# Patient Record
Sex: FEMALE | Race: WHITE | NOT HISPANIC OR LATINO
[De-identification: names, ages, dates, MRNs, and addresses within clinical notes are randomized per-mention and may not be internally consistent; named-entity substitution may affect disease eponyms.]

---

## 2019-04-01 ENCOUNTER — TRANSCRIPTION ENCOUNTER (OUTPATIENT)
Age: 29
End: 2019-04-01

## 2021-09-27 ENCOUNTER — EMERGENCY (EMERGENCY)
Facility: HOSPITAL | Age: 31
LOS: 1 days | Discharge: ANOTHER HOSPITAL | End: 2021-09-27
Attending: EMERGENCY MEDICINE
Payer: COMMERCIAL

## 2021-09-27 VITALS
OXYGEN SATURATION: 100 % | TEMPERATURE: 98 F | WEIGHT: 147.93 LBS | RESPIRATION RATE: 18 BRPM | DIASTOLIC BLOOD PRESSURE: 77 MMHG | SYSTOLIC BLOOD PRESSURE: 132 MMHG | HEART RATE: 79 BPM

## 2021-09-27 DIAGNOSIS — F41.1 GENERALIZED ANXIETY DISORDER: ICD-10-CM

## 2021-09-27 DIAGNOSIS — F33.2 MAJOR DEPRESSIVE DISORDER, RECURRENT SEVERE WITHOUT PSYCHOTIC FEATURES: ICD-10-CM

## 2021-09-27 LAB
ANION GAP SERPL CALC-SCNC: 6 MMOL/L — SIGNIFICANT CHANGE UP (ref 5–17)
APAP SERPL-MCNC: <10 UG/ML — SIGNIFICANT CHANGE UP (ref 10–30)
APPEARANCE UR: CLEAR — SIGNIFICANT CHANGE UP
BASOPHILS # BLD AUTO: 0.06 K/UL — SIGNIFICANT CHANGE UP (ref 0–0.2)
BASOPHILS NFR BLD AUTO: 0.7 % — SIGNIFICANT CHANGE UP (ref 0–2)
BILIRUB UR-MCNC: NEGATIVE — SIGNIFICANT CHANGE UP
BUN SERPL-MCNC: 11 MG/DL — SIGNIFICANT CHANGE UP (ref 7–18)
CALCIUM SERPL-MCNC: 9.6 MG/DL — SIGNIFICANT CHANGE UP (ref 8.4–10.5)
CHLORIDE SERPL-SCNC: 105 MMOL/L — SIGNIFICANT CHANGE UP (ref 96–108)
CO2 SERPL-SCNC: 28 MMOL/L — SIGNIFICANT CHANGE UP (ref 22–31)
COLOR SPEC: YELLOW — SIGNIFICANT CHANGE UP
COVID-19 SPIKE DOMAIN AB INTERP: POSITIVE
COVID-19 SPIKE DOMAIN ANTIBODY RESULT: >250 U/ML — HIGH
CREAT SERPL-MCNC: 0.75 MG/DL — SIGNIFICANT CHANGE UP (ref 0.5–1.3)
DIFF PNL FLD: NEGATIVE — SIGNIFICANT CHANGE UP
EOSINOPHIL # BLD AUTO: 0.16 K/UL — SIGNIFICANT CHANGE UP (ref 0–0.5)
EOSINOPHIL NFR BLD AUTO: 1.8 % — SIGNIFICANT CHANGE UP (ref 0–6)
ETHANOL SERPL-MCNC: <3 MG/DL — SIGNIFICANT CHANGE UP (ref 0–10)
GLUCOSE SERPL-MCNC: 114 MG/DL — HIGH (ref 70–99)
GLUCOSE UR QL: NEGATIVE — SIGNIFICANT CHANGE UP
HCG SERPL-ACNC: <1 MIU/ML — HIGH
HCT VFR BLD CALC: 42.3 % — SIGNIFICANT CHANGE UP (ref 39–50)
HGB BLD-MCNC: 14 G/DL — SIGNIFICANT CHANGE UP (ref 13–17)
IMM GRANULOCYTES NFR BLD AUTO: 0.2 % — SIGNIFICANT CHANGE UP (ref 0–1.5)
KETONES UR-MCNC: NEGATIVE — SIGNIFICANT CHANGE UP
LEUKOCYTE ESTERASE UR-ACNC: NEGATIVE — SIGNIFICANT CHANGE UP
LYMPHOCYTES # BLD AUTO: 2.57 K/UL — SIGNIFICANT CHANGE UP (ref 1–3.3)
LYMPHOCYTES # BLD AUTO: 29.1 % — SIGNIFICANT CHANGE UP (ref 13–44)
MCHC RBC-ENTMCNC: 28.6 PG — SIGNIFICANT CHANGE UP (ref 27–34)
MCHC RBC-ENTMCNC: 33.1 GM/DL — SIGNIFICANT CHANGE UP (ref 32–36)
MCV RBC AUTO: 86.5 FL — SIGNIFICANT CHANGE UP (ref 80–100)
MONOCYTES # BLD AUTO: 0.47 K/UL — SIGNIFICANT CHANGE UP (ref 0–0.9)
MONOCYTES NFR BLD AUTO: 5.3 % — SIGNIFICANT CHANGE UP (ref 2–14)
NEUTROPHILS # BLD AUTO: 5.56 K/UL — SIGNIFICANT CHANGE UP (ref 1.8–7.4)
NEUTROPHILS NFR BLD AUTO: 62.9 % — SIGNIFICANT CHANGE UP (ref 43–77)
NITRITE UR-MCNC: NEGATIVE — SIGNIFICANT CHANGE UP
NRBC # BLD: 0 /100 WBCS — SIGNIFICANT CHANGE UP (ref 0–0)
PCP SPEC-MCNC: SIGNIFICANT CHANGE UP
PH UR: 6 — SIGNIFICANT CHANGE UP (ref 5–8)
PLATELET # BLD AUTO: 405 K/UL — HIGH (ref 150–400)
POTASSIUM SERPL-MCNC: 3.9 MMOL/L — SIGNIFICANT CHANGE UP (ref 3.5–5.3)
POTASSIUM SERPL-SCNC: 3.9 MMOL/L — SIGNIFICANT CHANGE UP (ref 3.5–5.3)
PROT UR-MCNC: NEGATIVE — SIGNIFICANT CHANGE UP
RBC # BLD: 4.89 M/UL — SIGNIFICANT CHANGE UP (ref 4.2–5.8)
RBC # FLD: 12.3 % — SIGNIFICANT CHANGE UP (ref 10.3–14.5)
SALICYLATES SERPL-MCNC: <1.7 MG/DL — LOW (ref 2.8–20)
SARS-COV-2 IGG+IGM SERPL QL IA: >250 U/ML — HIGH
SARS-COV-2 IGG+IGM SERPL QL IA: POSITIVE
SARS-COV-2 RNA SPEC QL NAA+PROBE: SIGNIFICANT CHANGE UP
SODIUM SERPL-SCNC: 139 MMOL/L — SIGNIFICANT CHANGE UP (ref 135–145)
SP GR SPEC: 1.01 — SIGNIFICANT CHANGE UP (ref 1.01–1.02)
UROBILINOGEN FLD QL: NEGATIVE — SIGNIFICANT CHANGE UP
WBC # BLD: 8.84 K/UL — SIGNIFICANT CHANGE UP (ref 3.8–10.5)
WBC # FLD AUTO: 8.84 K/UL — SIGNIFICANT CHANGE UP (ref 3.8–10.5)

## 2021-09-27 PROCEDURE — 99285 EMERGENCY DEPT VISIT HI MDM: CPT

## 2021-09-27 PROCEDURE — 90792 PSYCH DIAG EVAL W/MED SRVCS: CPT | Mod: 95

## 2021-09-27 NOTE — ED BEHAVIORAL HEALTH ASSESSMENT NOTE - RISK ASSESSMENT
High Acute Suicide Risk Has h/o cutting, psych admission, active depression/SI not on meds, no access to firearm, has supportive social network, at moderate to high acute risk at this time, and has chronic risk as well

## 2021-09-27 NOTE — ED BEHAVIORAL HEALTH NOTE - BEHAVIORAL HEALTH NOTE
============  ED COURSE   ============  SOURCE: Chart review     ARRIVAL:  Walk in   BELONGINGS: Nothing of note    BEHAVIOR: Pt was compliant with good cooperation for entire process of wanding/search/ and gowning and was escorted to the  area with no issues. Nothing of note in pt’s belongings. RN reports patient provided both urine specimen and routine blood work willingly. Hygiene is good, pt. endorses SI with a plan to jump out of a window, denies HI. Pt’s affect is depressed, speech is at a normal rate, clear, good articulation, thoughts are linear and logical, eye contact is good, denies AVH/delusions, no aggression or behavioral issues and is AOX4.   TREATMENT:  None required  VISITORS:  Spouse was at bedside    ========================  COLLATERAL  ========================  NAME: Agustín Castañeda  NUMBER: [phone]  RELATIONSHIP: Spouse  COMMENTS: Left spouse 2 voicemail's waiting for a callback      “Patient gives permission to obtain collateral from Spouse_____:  (X  ) Yes  (  )  No  Rationale for overriding objection            (  ) Lack of capacity. Details: ________            (  ) Assessing risk of danger to self/others. Details: ________      Rationale for selecting specific collateral source            (  ) Potential to impact risk of danger to self/others and no alternative equivalent. Details:     COVID Exposure Screen- collateral (i.e. third-party, chart review, belongings, etc; include EMS and ED staff)  1.	*Has the patient had a COVID-19 test in the last 90 days?  (X  ) Yes   (  ) No   (  ) Unknown- Reason: _____  IF YES PROCEED TO QUESTION #2. IF NO OR UNKNOWN, PLEASE SKIP TO QUESTION #3.  2.	Date of test(s) and result(s): __neg 9/27/21______  3.	*Has the patient tested positive for COVID-19 antibodies? (  ) Yes   (  ) No   (  ) Unknown- Reason: _____  IF YES PROCEED TO QUESTION #4. IF NO or UNKNOWN, PLEASE SKIP TO QUESTION #5.  4.	Date of positive antibody test: ________  5.	*Has the patient received 2 doses of the COVID-19 vaccine? (X  ) Yes   (  ) No   (  ) Unknown- Reason: _____  IF YES PROCEED TO QUESTION #6. IF NO or UNKNOWN, PLEASE SKIP TO QUESTION #7.  6.	 Date of second dose: _02/27/21_______  7.	*In the past 10 days, has the patient been around anyone with a positive COVID-19 test?* (  ) Yes   (  ) No   (  ) Unknown- Reason: __  IF YES PROCEED TO QUESTION #8. IF NO or UNKNOWN, PLEASE SKIP TO QUESTION #13.  8.	Was the patient within 6 feet of them for at least 15 minutes? (  ) Yes   (  ) No   (  ) Unknown- Reason: _____  9.	Did the patient provide care for them? (  ) Yes   (  ) No   (  ) Unknown- Reason: ______  10.	Did the patient have direct physical contact with them (touched, hugged, or kissed them)? (  ) Yes   (  ) No    (  ) Unknown- Reason: __  11.	Did the patient share eating or drinking utensils with them? (  ) Yes   (  ) No    (  ) Unknown- Reason: ____  12.	Did they sneeze, cough, or somehow get respiratory droplets on the patient? (  ) Yes   (  ) No    (  ) Unknown- Reason: ______  13.	*Has the patient been out of [place] within the past 10 days?* (  ) Yes   ( X ) No   (  ) Unknown- Reason: _____  IF YES PLEASE ANSWER THE FOLLOWING QUESTIONS:  14.	Which state/country did they go to? ______  15.	Were they there over 24 hours? (  ) Yes   (  ) No    (  ) Unknown- Reason: ______

## 2021-09-27 NOTE — ED PROVIDER NOTE - CLINICAL SUMMARY MEDICAL DECISION MAKING FREE TEXT BOX
32yo M with hx ADHD, depression and asthma presents with suicidal ideation with plan. Will obtain labs prior to psych consultation.    labs unremarkable  @6am telepsych consulted for evaluation.  patient signedout to Dr. Navarrete at 730am.

## 2021-09-27 NOTE — ED ADULT NURSE NOTE - ED STAT RN HANDOFF DETAILS 3
No Endorsed from [facility] pt on 1:1 obs for SI remains with SI by jumping out of a window. 1:1 observer in arms reach YG/RA. Right AC 20G SL in place

## 2021-09-27 NOTE — ED BEHAVIORAL HEALTH ASSESSMENT NOTE - HPI (INCLUDE ILLNESS QUALITY, SEVERITY, DURATION, TIMING, CONTEXT, MODIFYING FACTORS, ASSOCIATED SIGNS AND SYMPTOMS)
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  Patient was bib  as they are feeling more depressed with SI to jump out of the window.  Patient states she was overwhelmed over the summer anticipating school to start, and once it started in person, they feel very anxious, and restless all the time, has hopelessness, poor sleep, appetite, and for the past 3 weeks, has panic attack almost daily in evening and has SI with more frequent thoughts about jumping out of window, and has active intent to act on those thoughts.  Denied HI/AVH/roderick/psychosis.  Denied medical condition/substance use.  Requesting for voluntary admission.    COVID Exposure Screen- Patient    Have you had a COVID-19 test in the last 90 days? Yes    Have you tested positive for COVID-19 antibodies? I don't know    Have you received 2 doses of the COVID-19 vaccine? yes, second dose Feb 2021    In the past 10 days, have you been around anyone with a positive COVID-19 test? no    Have you been out of [place] within the past 10 days? no

## 2021-09-27 NOTE — ED BEHAVIORAL HEALTH ASSESSMENT NOTE - SUMMARY
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  Patient was bib  as they are feeling more depressed with SI to jump out of the window. Patient's presentation and history are consistent with ALVARADO and MDD, has active plan and intent for suicide, and will benefit from inpatient level of care for stabilization.

## 2021-09-27 NOTE — ED PROVIDER NOTE - OBJECTIVE STATEMENT
32yo M with hx ADHD, depression and asthma presents with suicidal ideation. Reports that he has been having worsening suicidal ideation for the last few weeks, he has been seeing his mental health provider [facility] Whitaker in [place]. Reports when he last saw him he contracted for safety and agreed to come to ED if he ever felt like acting out of his thoughts. Reports last night he wanted to jump out of his 3rd floor window thus decided to come to ED for treatment. Reports hx depression but he last took medication 7-8yrs ago. Reports he currently takes adderall 5mg twice daily as needed for his ADHD. Denies smoking cigarettes or using drugs. Reports occasional alcohol use, had 1 rum and coke at 8pm last night.   Agustín Castañeda available by phone [phone]

## 2021-09-27 NOTE — ED PROVIDER NOTE - PROGRESS NOTE DETAILS
Michael GUERRA: Received sign out from Dr. Cyr. pt awaiting placement from psych. Pt requesting anxiolytic. Ativan ordered Michael GUERRA: Sign out to Dr. Phillips. Pt in no distress, awaiting psych admission. (Alan) - awaiting bed placement for voluntary psych admission (Alan) - tonny called and stated pt accepted to [facility], [address] [place] [address], by Dr Yamil Mosquera. Transfer consent signed by pt.

## 2021-09-28 ENCOUNTER — INPATIENT (INPATIENT)
Facility: HOSPITAL | Age: 31
LOS: 9 days | Discharge: HOME | End: 2021-10-08
Attending: STUDENT IN AN ORGANIZED HEALTH CARE EDUCATION/TRAINING PROGRAM | Admitting: STUDENT IN AN ORGANIZED HEALTH CARE EDUCATION/TRAINING PROGRAM
Payer: COMMERCIAL

## 2021-09-28 VITALS
OXYGEN SATURATION: 98 % | HEART RATE: 79 BPM | RESPIRATION RATE: 18 BRPM | DIASTOLIC BLOOD PRESSURE: 67 MMHG | TEMPERATURE: 98 F | SYSTOLIC BLOOD PRESSURE: 97 MMHG

## 2021-09-28 VITALS — TEMPERATURE: 98 F | HEIGHT: 63 IN | WEIGHT: 145.51 LBS

## 2021-09-28 DIAGNOSIS — F32.9 MAJOR DEPRESSIVE DISORDER, SINGLE EPISODE, UNSPECIFIED: ICD-10-CM

## 2021-09-28 PROBLEM — F90.9 ATTENTION-DEFICIT HYPERACTIVITY DISORDER, UNSPECIFIED TYPE: Chronic | Status: ACTIVE | Noted: 2021-09-27

## 2021-09-28 PROBLEM — J45.909 UNSPECIFIED ASTHMA, UNCOMPLICATED: Chronic | Status: ACTIVE | Noted: 2021-09-27

## 2021-09-28 PROCEDURE — 80307 DRUG TEST PRSMV CHEM ANLYZR: CPT

## 2021-09-28 PROCEDURE — 86769 SARS-COV-2 COVID-19 ANTIBODY: CPT

## 2021-09-28 PROCEDURE — 85025 COMPLETE CBC W/AUTO DIFF WBC: CPT

## 2021-09-28 PROCEDURE — 99285 EMERGENCY DEPT VISIT HI MDM: CPT

## 2021-09-28 PROCEDURE — 93005 ELECTROCARDIOGRAM TRACING: CPT

## 2021-09-28 PROCEDURE — 36415 COLL VENOUS BLD VENIPUNCTURE: CPT

## 2021-09-28 PROCEDURE — 99215 OFFICE O/P EST HI 40 MIN: CPT | Mod: 95

## 2021-09-28 PROCEDURE — 84702 CHORIONIC GONADOTROPIN TEST: CPT

## 2021-09-28 PROCEDURE — 87635 SARS-COV-2 COVID-19 AMP PRB: CPT

## 2021-09-28 PROCEDURE — 80048 BASIC METABOLIC PNL TOTAL CA: CPT

## 2021-09-28 PROCEDURE — 81003 URINALYSIS AUTO W/O SCOPE: CPT

## 2021-09-28 RX ORDER — HALOPERIDOL DECANOATE 100 MG/ML
5 INJECTION INTRAMUSCULAR EVERY 6 HOURS
Refills: 0 | Status: DISCONTINUED | OUTPATIENT
Start: 2021-09-28 | End: 2021-10-08

## 2021-09-28 RX ORDER — ACETAMINOPHEN 500 MG
650 TABLET ORAL EVERY 6 HOURS
Refills: 0 | Status: DISCONTINUED | OUTPATIENT
Start: 2021-09-28 | End: 2021-10-08

## 2021-09-28 RX ORDER — INFLUENZA VIRUS VACCINE 15; 15; 15; 15 UG/.5ML; UG/.5ML; UG/.5ML; UG/.5ML
0.5 SUSPENSION INTRAMUSCULAR ONCE
Refills: 0 | Status: DISCONTINUED | OUTPATIENT
Start: 2021-09-28 | End: 2021-10-08

## 2021-09-28 RX ORDER — LANOLIN ALCOHOL/MO/W.PET/CERES
5 CREAM (GRAM) TOPICAL AT BEDTIME
Refills: 0 | Status: DISCONTINUED | OUTPATIENT
Start: 2021-09-28 | End: 2021-10-08

## 2021-09-28 RX ORDER — HALOPERIDOL DECANOATE 100 MG/ML
5 INJECTION INTRAMUSCULAR ONCE
Refills: 0 | Status: DISCONTINUED | OUTPATIENT
Start: 2021-09-28 | End: 2021-10-08

## 2021-09-28 RX ORDER — DIPHENHYDRAMINE HCL 50 MG
50 CAPSULE ORAL EVERY 6 HOURS
Refills: 0 | Status: DISCONTINUED | OUTPATIENT
Start: 2021-09-28 | End: 2021-10-08

## 2021-09-28 RX ORDER — HALOPERIDOL DECANOATE 100 MG/ML
5 INJECTION INTRAMUSCULAR EVERY 6 HOURS
Refills: 0 | Status: DISCONTINUED | OUTPATIENT
Start: 2021-09-28 | End: 2021-09-28

## 2021-09-28 RX ADMIN — Medication 1 MILLIGRAM(S): at 01:52

## 2021-09-28 RX ADMIN — Medication 5 MILLIGRAM(S): at 21:18

## 2021-09-28 NOTE — PROGRESS NOTE BEHAVIORAL HEALTH - SUMMARY
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  Patient was bib  as they are feeling more depressed with SI to jump out of the window. Patient's presentation and history are consistent with ALVARADO and MDD, has active plan and intent for suicide, and will benefit from inpatient level of care for stabilization. He agrees to voluntary admission.

## 2021-09-28 NOTE — PROGRESS NOTE BEHAVIORAL HEALTH - RISK ASSESSMENT
Patient requires inpatient psychiatric hospitalization for safety, stabilization and aftercare planning

## 2021-09-28 NOTE — PROGRESS NOTE BEHAVIORAL HEALTH - NSBHADMITMEDEDUDETAILS_A_CORE FT
discussed risks, benefits, side effect profile, and alternatives to escitalopram to target depressive symptoms; patient reports he wants time to think about his options before consenting to starting a medication

## 2021-09-28 NOTE — PROGRESS NOTE BEHAVIORAL HEALTH - NSBHFUPINTERVALHXFT_PSY_A_CORE
Patient seen and reassessed. Chart reviewed. Today patient continues to endorse SI with no intent or specific plan. Reports mood as "anxious". Says he feels "safe in the hospital" and says he would be able to come to staff to ask for help if feeling unsafe while on the inpatient unit. Reports poor sleep and appetite. Continues having anxious ruminative thoughts about the future.    FULL NOTE TO FOLLOW - PATIENT TO BE TRANSFERRED TO [facility] FOR INPATIENT PSYCH ADMISSION. Patient seen and reassessed. Chart reviewed. Today patient continues to endorse SI with no intent or specific plan. Reports mood as "anxious". Says he feels "safe in the hospital" and says he would be able to come to staff to ask for help if feeling unsafe while on the inpatient unit. Reports poor sleep and appetite. Continues having anxious ruminative thoughts about the future. Patient has no other acute complaints at this time.

## 2021-09-28 NOTE — BH CHART NOTE - NSEVENTNOTEFT_PSY_ALL_CORE
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  Patient was bib  as they are feeling more depressed with SI to jump out of the window.  Patient states she was overwhelmed over the summer anticipating school to start, and once it started in person, they feel very anxious, and restless all the time, has hopelessness, poor sleep, appetite, and for the past 3 weeks, has panic attack almost daily in evening and has SI with more frequent thoughts about jumping out of window, and has active intent to act on those thoughts.  Denied HI/AVH/roderick/psychosis.  Denied medical condition/substance use.  Requesting for voluntary admission."    Upon arrival to the unit, patient is calm and cooperative. Patient identifies working as special , and new diagnosis of ADHD in 2021 as significant stressors that have precipitated suicidal ideation. Patient has had increasing thoughts of suicide, but had stopped himself from attempting due to protective factors such as responsibility to , family, and dogs. 2 days before admission, patient's  noticed that he was "off" and had thoughts of completing suicide by jumping out a 3rd story window, which led to  bringing patient to hospital. Patient states that he has current SI, but without plan. Patient continues to feel depressed and anxious, is hopeless, has poor appetite, poor sleep, low energy, and states that he has experienced panic attacks "nearly everyday" for past few weeks. Patient states that he was diagnosed with anxiety and depression 9 years ago, when he was admitted for 2 weeks for SI, which coincided with discovery of being transgender. Patient saw psychiatrist then, had trials of many SSRIs including Prozac and Zoloft, which patient states had not worked for him, and psychotherapy. Patient last saw a psychiatric NP last week, who prescribed him Adderall. Patient denies history of manic, psychosis, HI. Patient states he has had self injurious, non suicidal behavior of superficial cuts to wrist, last in 2021.     PPH: ALVARADO, moderate MDD, ADHD    Meds: Adderall 5mg BID    FH: Mother- anxiety    PMH: Asthma (denied need for inhaler)    Social: Domiciled with  and dogs, employed as special . Denies substance use, access to firearms.    Labs:                          14.0   8.84  )-----------( 405      ( 27 Sep 2021 05:04 )             42.3           139  |  105  |  11  ----------------------------<  114<H>  3.9   |  28  |  0.75    Ca    9.6      27 Sep 2021 05:04    Urinalysis Basic - ( 27 Sep 2021 10:20 )    Color: Yellow / Appearance: Clear / S.015 / pH: x  Gluc: x / Ketone: Negative  / Bili: Negative / Urobili: Negative   Blood: x / Protein: Negative / Nitrite: Negative   Leuk Esterase: Negative / RBC: x / WBC x   Sq Epi: x / Non Sq Epi: x / Bacteria: x    ICU Vital Signs Last 24 Hrs  T(C): 36.8 (28 Sep 2021 17:10), Max: 37.1 (27 Sep 2021 21:38)  T(F): 98.2 (28 Sep 2021 17:10), Max: 98.7 (27 Sep 2021 21:38)  HR: 79 (28 Sep 2021 12:49) (55 - 83)  BP: 97/67 (28 Sep 2021 12:49) (83/63 - 107/67)  BP(mean): --  ABP: --  ABP(mean): --  RR: 18 (28 Sep 2021 12:49) (17 - 18)  SpO2: 98% (28 Sep 2021 12:49) (98% - 100%)    MSE:  On exam today, patient is adequately groomed, wearing hospital gown, appears stated age  Patient maintains good eye contact  Speech is of normal volume and normal rate  Patient does not exhibit psychomotor retardation/agitation  Mood: depressed   Affect: constricted  Thought Process: linear, fair associations  Thought Content: SI with no plan. No delusions.  Perception: no AVH, does not seem internally preoccupied   Patient remains alert and oriented to person, place, and situation  Patient is grossly cognitively intact with fair fundamental of knowledge. Memory is intact  Insight is fair, judgement is fair  Impulse control intact at this time    Risk Assessment:   Risk factors: +current SI, h/o SIIP, h/o psych admissions    Protective factors: no current HIIP, no h/o SA, no access to weapons, no active substance abuse, good physical health, engaged in work, domiciled, intact marriage, social supports, positive therapeutic relationship, engaged in treatment, compliant with treatment, help-seeking behaviors    Overall, pt is at moderate risk of harm and meets criteria for psychiatric admission.    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  Patient was bib  as they are feeling more depressed with SI to jump out of the window.  Patient states she was overwhelmed over the summer anticipating school to start, and once it started in person, they feel very anxious, and restless all the time, has hopelessness, poor sleep, appetite, and for the past 3 weeks, has panic attack almost daily in evening and has SI with more frequent thoughts about jumping out of window, and has active intent to act on those thoughts.  Denied HI/AVH/roderick/psychosis.  Denied medical condition/substance use.  Requesting for voluntary admission.    Upon arrival to the unit, patient continues to express anxiety and depression with SI, but without plan. Patient meets criteria for MDD episode.    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  Patient was bib  as they are feeling more depressed with SI to jump out of the window.  Patient states she was overwhelmed over the summer anticipating school to start, and once it started in person, they feel very anxious, and restless all the time, has hopelessness, poor sleep, appetite, and for the past 3 weeks, has panic attack almost daily in evening and has SI with more frequent thoughts about jumping out of window, and has active intent to act on those thoughts.  Denied HI/AVH/roderick/psychosis.  Denied medical condition/substance use.  Requesting for voluntary admission."    Upon arrival to the unit, patient is calm and cooperative. Patient identifies working as special , and new diagnosis of ADHD in 2021 as significant stressors that have precipitated suicidal ideation. Patient has had increasing thoughts of suicide, but had stopped himself from attempting due to protective factors such as responsibility to , family, and dogs. 2 days before admission, patient's  noticed that he was "off" and had thoughts of completing suicide by jumping out a 3rd story window, which led to  bringing patient to hospital. Patient states that he has current SI, but without plan. Patient continues to feel depressed and anxious, is hopeless, has poor appetite, poor sleep, low energy, and states that he has experienced panic attacks "nearly everyday" for past few weeks. Patient states that he was diagnosed with anxiety and depression 9 years ago, when he was admitted for 2 weeks for SI, which coincided with discovery of being transgender. Patient saw psychiatrist then, had trials of many SSRIs including Prozac and Zoloft, which patient states had not worked for him, and psychotherapy. Patient last saw a psychiatric NP last week, who prescribed him Adderall. Patient denies history of manic, psychosis, HI. Patient states he has had self injurious, non suicidal behavior of superficial cuts to wrist, last in 2021.     PPH: ALVARADO, moderate MDD, ADHD    Meds: Adderall 5mg BID    FH: Mother- anxiety    PMH: Asthma (denied need for inhaler)    Social: Domiciled with  and dogs, employed as special . Denies substance use, access to firearms.    Labs:                          14.0   8.84  )-----------( 405      ( 27 Sep 2021 05:04 )             42.3           139  |  105  |  11  ----------------------------<  114<H>  3.9   |  28  |  0.75    Ca    9.6      27 Sep 2021 05:04    Urinalysis Basic - ( 27 Sep 2021 10:20 )    Color: Yellow / Appearance: Clear / S.015 / pH: x  Gluc: x / Ketone: Negative  / Bili: Negative / Urobili: Negative   Blood: x / Protein: Negative / Nitrite: Negative   Leuk Esterase: Negative / RBC: x / WBC x   Sq Epi: x / Non Sq Epi: x / Bacteria: x    ICU Vital Signs Last 24 Hrs  T(C): 36.8 (28 Sep 2021 17:10), Max: 37.1 (27 Sep 2021 21:38)  T(F): 98.2 (28 Sep 2021 17:10), Max: 98.7 (27 Sep 2021 21:38)  HR: 79 (28 Sep 2021 12:49) (55 - 83)  BP: 97/67 (28 Sep 2021 12:49) (83/63 - 107/67)  BP(mean): --  ABP: --  ABP(mean): --  RR: 18 (28 Sep 2021 12:49) (17 - 18)  SpO2: 98% (28 Sep 2021 12:49) (98% - 100%)    MSE:  On exam today, patient is adequately groomed, wearing hospital gown, appears stated age  Patient maintains good eye contact  Speech is of normal volume and normal rate  Patient does not exhibit psychomotor retardation/agitation  Mood: depressed   Affect: constricted  Thought Process: linear, fair associations  Thought Content: SI with no plan. No delusions.  Perception: no AVH, does not seem internally preoccupied   Patient remains alert and oriented to person, place, and situation  Patient is grossly cognitively intact with fair fundamental of knowledge. Memory is intact  Insight is fair, judgement is fair  Impulse control intact at this time    Risk Assessment:   Risk factors: +current SI, h/o SIIP, h/o psych admissions    Protective factors: no current HIIP, no h/o SA, no access to weapons, no active substance abuse, good physical health, engaged in work, domiciled, intact marriage, social supports, positive therapeutic relationship, engaged in treatment, compliant with treatment, help-seeking behaviors    Overall, pt is at moderate risk of harm and meets criteria for psychiatric admission.    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  Patient was bib  as they are feeling more depressed with SI to jump out of the window.  Patient states she was overwhelmed over the summer anticipating school to start, and once it started in person, they feel very anxious, and restless all the time, has hopelessness, poor sleep, appetite, and for the past 3 weeks, has panic attack almost daily in evening and has SI with more frequent thoughts about jumping out of window, and has active intent to act on those thoughts.  Denied HI/AVH/roderick/psychosis.  Denied medical condition/substance use.  Requesting for voluntary admission.    Upon arrival to the unit, patient continues to express anxiety and depression with SI, but without plan. Patient meets criteria for MDD episode.    Plan:  Admit to 2N on voluntary status (9.13)  Routine observation  PRNs placed  F/u ECG in AM  Defer med management to primary team

## 2021-09-28 NOTE — ED BEHAVIORAL HEALTH NOTE - BEHAVIORAL HEALTH NOTE
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  Patient was bib  as they are feeling more depressed with SI to jump out of the window. Patient's presentation and history are consistent with ALVARADO and MDD, has active plan and intent for suicide, and will benefit from inpatient level of care for stabilization. Telepsych reassessment:    Chart reviewed, patient seen. He is calm, cooperative, sitting comfortably in bed in no acute distress. He continues to endorse SI with thoughts to jump out of a window. He states he is having trouble sleeping and his anxiety is spiking today. He still feels depressed with no changes. Patient requested ativan. No other complaints.     MSE: average build, hygiene/grooming good, behavior is cooperative, EC/relatedness is good, IC normal, speech is spontaneous with normal rate, soft volume, mood is  anxious, affect is depressed and anxious, affect range constricted TP: linear TC: reports active SI/HI , denies AVH. Oriented x 3, attention and concentration are normal.  Insight and judgment are fair.    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  Patient was bib  as they are feeling more depressed with SI to jump out of the window. Patient's presentation and history are consistent with ALVARADO and MDD, has active plan and intent for suicide, and will benefit from inpatient level of care for stabilization. He agrees to voluntary admission .    Plan:  -Continue to hold for bed  -may give ativan 1mg q6h prn for anxiety  - PRNS: haldol 5mg, ativan 2mg, diphenhydramine 50mg, PO/IM, Q6H for Agitation

## 2021-09-28 NOTE — PROGRESS NOTE BEHAVIORAL HEALTH - NSBHADMITCOUNSEL_PSY_A_CORE
I spent a total of 55 minutes reviewing past medical records, evaluating the patient, discussing treatment options with the patient, communicating with other healthcare professionals, coordinating care, and documenting in the EMR./diagnostic results/impressions and/or recommended studies/risks and benefits of treatment options/instructions for management, treatment and follow up/importance of adherence to chosen treatment/risk factor reduction/client/family/caregiver education/prognosis

## 2021-09-29 DIAGNOSIS — J45.909 UNSPECIFIED ASTHMA, UNCOMPLICATED: ICD-10-CM

## 2021-09-29 DIAGNOSIS — E28.2 POLYCYSTIC OVARIAN SYNDROME: ICD-10-CM

## 2021-09-29 PROCEDURE — 99223 1ST HOSP IP/OBS HIGH 75: CPT

## 2021-09-29 PROCEDURE — 93010 ELECTROCARDIOGRAM REPORT: CPT

## 2021-09-29 RX ORDER — BUPROPION HYDROCHLORIDE 150 MG/1
150 TABLET, EXTENDED RELEASE ORAL DAILY
Refills: 0 | Status: DISCONTINUED | OUTPATIENT
Start: 2021-09-29 | End: 2021-10-04

## 2021-09-29 RX ADMIN — Medication 5 MILLIGRAM(S): at 20:48

## 2021-09-29 RX ADMIN — BUPROPION HYDROCHLORIDE 150 MILLIGRAM(S): 150 TABLET, EXTENDED RELEASE ORAL at 11:48

## 2021-09-29 RX ADMIN — Medication 0.5 MILLIGRAM(S): at 17:30

## 2021-09-29 NOTE — BH INPATIENT PSYCHIATRY ASSESSMENT NOTE - DESCRIPTION
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

## 2021-09-29 NOTE — BH INPATIENT PSYCHIATRY ASSESSMENT NOTE - NSBHCHARTREVIEWVS_PSY_A_CORE FT
Vital Signs Last 24 Hrs  T(C): 36.4 (09-29-21 @ 07:36), Max: 36.8 (09-28-21 @ 17:10)  T(F): 97.5 (09-29-21 @ 07:36), Max: 98.2 (09-28-21 @ 17:10)  HR: 79 (09-28-21 @ 12:49) (79 - 79)  BP: 97/67 (09-28-21 @ 12:49) (97/67 - 97/67)  BP(mean): --  RR: 18 (09-28-21 @ 12:49) (18 - 18)  SpO2: 98% (09-28-21 @ 12:49) (98% - 98%)    Orthostatic VS  09-29-21 @ 07:36  Lying BP: --/-- HR: --  Sitting BP: 123/68 HR: 93  Standing BP: --/-- HR: --  Site: --  Mode: --  Orthostatic VS  09-28-21 @ 17:10  Lying BP: --/-- HR: --  Sitting BP: 122/75 HR: 88  Standing BP: 11/74 HR: 93  Site: --  Mode: --   Vital Signs Last 24 Hrs  T(C): 36.4 (09-29-21 @ 07:36), Max: 36.8 (09-28-21 @ 17:10)  T(F): 97.5 (09-29-21 @ 07:36), Max: 98.2 (09-28-21 @ 17:10)  HR: --  BP: --  BP(mean): --  RR: --  SpO2: --    Orthostatic VS  09-29-21 @ 07:36  Lying BP: --/-- HR: --  Sitting BP: 123/68 HR: 93  Standing BP: --/-- HR: --  Site: --  Mode: --  Orthostatic VS  09-28-21 @ 17:10  Lying BP: --/-- HR: --  Sitting BP: 122/75 HR: 88  Standing BP: 11/74 HR: 93  Site: --  Mode: --

## 2021-09-29 NOTE — BH INPATIENT PSYCHIATRY ASSESSMENT NOTE - NSDCCRITERIA_PSY_ALL_CORE
When pt is no longer an acute or imminent risk of harm to self or others, and is able to care for self safely, pt may then be discharged. CGI </=3

## 2021-09-29 NOTE — PSYCHIATRIC REHAB INITIAL EVALUATION - NSBHPRRECOMMEND_PSY_ALL_CORE
Psychiatric rehabilitation staff approached patient to orient him to psychiatric rehabilitation staff and services. Patient was receptive to psychiatric rehabilitation staff engagement. Patient was observed as verbal and cooperative during assessment. Pt reported reason for admission as increasing depression, anxiety, and SI to jump out of apartment window. 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 As per chart, pt is a transmale (female to male), history of depression, ADHD, seeks weekly therapy and NP who recently prescribed Adderall 5mg bid, 1 prior admission 9 yrs ago for similar issues, and was on medication 6 yrs ago and stopped due to no efficacy.

## 2021-09-29 NOTE — PSYCHIATRIC REHAB INITIAL EVALUATION - NSBHALCSUBTREAT_PSY_ALL_CORE
Pt reported seeking weekly therapy (was last in a session Saturday before admission), sees a NP, and as per chart has history of 1 prior admission 9 yrs ago.

## 2021-09-29 NOTE — BH INPATIENT PSYCHIATRY ASSESSMENT NOTE - NSBHASSESSSUMMFT_PSY_ALL_CORE
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 Denies substance use history. No formal medical history. Patient was bib  as pt feeling more depressed with SI to jump out of the window. Legal status Voluntary.     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 Patient also describes psychological struggle with gender identity. Patient denies active suicidal thoughts at present and states he has passive SI of not wanting to live but appears insightful and with good judgement in seeking treatment. PAtient does not wish to trial SSRI due to sexual dysfunction and wants to try Welbutrin; I discuss Welbutrin may cause worsening of anxiety and patient verbalizes understanding. We also discuss not continuing Adderall at this time and suggest once depression and anxiety are resolving that that may be a good time to consider targeting ADHD symptoms (though Welbutrin can be used for ADHD as well). Pt reports having done neuropsych testing with results indicating he has ADHD. Patient amenable to starting Wellbutrin at this time. Will also add PRN Lorazepam for anxiety. Patient able to contract for safety and does not have any active intent or plan and agrees to let staff know if thoughts worsen.     plan:   -Safety: q15 obs  -Psychopharm:  	Welbutrin XL 150mg daily   -PRNs: Haldol 5mg + Lorazepam 2mg +/- Benadryl PO or IM for non-redirectable agitation  	Lorazepam 0.5mg BID PRN PO for anxiety   -Individual, group, milieu therapy  -Psychoeducation provided to patient regarding indication for medications, alternatives, side effects, adherence.  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 Denies substance use history. No formal medical history. Patient was bib  as pt feeling more depressed with SI to jump out of the window. Legal status Voluntary.     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 Patient also describes psychological struggle with gender identity. Patient denies active suicidal thoughts at present and states he has passive SI of not wanting to live but appears insightful and with good judgement in seeking treatment. PAtient does not wish to trial SSRI due to sexual dysfunction and wants to try Welbutrin; I discuss Welbutrin may cause worsening of anxiety and patient verbalizes understanding. We also discuss not continuing Adderall at this time and suggest once depression and anxiety are resolving that that may be a good time to consider targeting ADHD symptoms (though Welbutrin can be used for ADHD as well). Pt reports having done neuropsych testing with results indicating he has ADHD. Impression: decompensated MDD severe recurrent, may also wish to explore gender dysphoria contributing to patient's presentation. Additionally, stimulants may have further exacerbated patient's symptoms as this was started very recently (last took over the weekend). Patient amenable to starting Wellbutrin at this time. Will also add PRN Lorazepam for anxiety. Patient able to contract for safety and does not have any active intent or plan and agrees to let staff know if thoughts worsen.     plan:   -Safety: q15 obs  -Psychopharm:  	Welbutrin XL 150mg daily   -PRNs: Haldol 5mg + Lorazepam 2mg +/- Benadryl PO or IM for non-redirectable agitation  	Lorazepam 0.5mg BID PRN PO for anxiety   -Individual, group, milieu therapy  -Psychoeducation provided to patient regarding indication for medications, alternatives, side effects, adherence.  -Medical:   	PCOS: not on medications   	Asthma: not on medications   -PRN: Tylenol 650mg q6-8hr for moderate pain  -Routine labs  -Disposition: Pending clinical course; coordinate with team social work  -Legal status: vol

## 2021-09-29 NOTE — BH INPATIENT PSYCHIATRY ASSESSMENT NOTE - NSCOMMENTSUICPROTRISKFACT_PSY_ALL_CORE
family, future oriented thinking in wanting to change jobs, pursuit of symptom recovery in a goal oriented way suggesting patient is hopeful

## 2021-09-29 NOTE — BH INPATIENT PSYCHIATRY ASSESSMENT NOTE - RISK ASSESSMENT
Patient is at chronically elevated risk for self-harm due to  gender dysphoria, psych dx, hx of non-adherence to treatment, prior hx of self-harm / NSSIB,  hx of impulsivity. At this time, those risks are mitigated by patient’s expressed desire to live.     Protective factors self-presentation seeking help, supportive family members, domiciled with  (providing additional layer of safety), has supportive outpatient provider (therapist), fear of dying, expresses future orientedness in seeking recovery and wanting to change employment     At present, patient has remained in good behavioral control while inpatient. They have not recently displayed any concerning symptoms of behavioral dysregulation or decompensation, has been actively participating in the milieu, expressing future oriented thinking and is insightful about self-harm. Currently, patient is adamant about their denial of suicidal ideation intent or plan. They are not at acutely elevated risk for self-harm at this time.  Patient is at chronically elevated risk for self-harm due to  gender dysphoria, psych dx, hx of non-adherence to treatment, prior hx of self-harm / NSSIB,  hx of impulsivity. At this time, those risks are mitigated by patient’s expressed desire to live.     Protective factors self-presentation seeking help, supportive family members, domiciled with  (providing additional layer of safety), has supportive outpatient provider (therapist), fear of dying, expresses future oriented thinking in seeking recovery and wanting to change employment     chronically elevated risk

## 2021-09-29 NOTE — BH INPATIENT PSYCHIATRY ASSESSMENT NOTE - ACCESS TO FIREARM
Ongoing SW/CM Assessment/Plan of Care Note     See SW/CM flowsheets for goals and other objective data.    Patient/Family discharge goal (s):  Goal #1: Psychosocial needs assessed  Goal #2: Extended Care Facility discharge arranged  Goal #3: Home Care arranged or issues addressed    PT Recommendation:  Recommendation for Discharge: PT: Sub-acute nursing home    OT Recommendation:  Recommendations for Discharge: OT: Sub-acute nursing home    Disposition:  Planned Discharge Destination: Rehabilitation/Skilled Care    Progress note:   SW following for d/c planning. Chart reviewed and case discussed in OFT's. Pt anticipated to d/c in 1-2 days. [facility] has been accepted [facility] and can transfer once medically stable. SW to follow.          No

## 2021-09-29 NOTE — BH SOCIAL WORK INITIAL PSYCHOSOCIAL EVALUATION - NSPASTPSYCHHX_PSY_ALL_CORE
Yes Scribe Attestation (For Scribes USE Only)... Scribe Attestation (For Scribes USE Only).../Attending Attestation (For Attendings USE Only)...

## 2021-09-29 NOTE — BH SOCIAL WORK INITIAL PSYCHOSOCIAL EVALUATION - NSCMSPTSTRENGTHS_PSY_ALL_CORE
Compliance to treatment/Expressive of emotions/Financial stability/Future/goal oriented/Highly motivated for treatment/Intact employment/Intelligence/Physically healthy/Positive attitude/Self-reliant/Strong support system/Supportive family

## 2021-09-29 NOTE — BH SOCIAL WORK INITIAL PSYCHOSOCIAL EVALUATION - OTHER PAST PSYCHIATRIC HISTORY (INCLUDE DETAILS REGARDING ONSET, COURSE OF ILLNESS, INPATIENT/OUTPATIENT TREATMENT)
Pt reports a long hx of depression and anxiety,  1 prior inpt admission 8yrs ago for depression and SI, pt has a hx of outpatient tx, currently has a therapist since Mar 2021 but no prescriber. Per EMR pt has a hx of SA/SI,  had dx of adhd during adolescence, hx of remote cutting, hx of low mood and acute anxiety when symptomatic, no hx of substance use, no hx of violent aggression, no legal issues or medical problems

## 2021-09-29 NOTE — BH INPATIENT PSYCHIATRY ASSESSMENT NOTE - OTHER PAST PSYCHIATRIC HISTORY (INCLUDE DETAILS REGARDING ONSET, COURSE OF ILLNESS, INPATIENT/OUTPATIENT TREATMENT)
1 prior hospitalization for SI 9 years ago   no prior suicide attempts  engages in self-cutting last cut 6 months ago, denies any wish to self-cut at this time   saw psych NP for 1 visit where he was prescribed Adderall 5mg BID   has weekly therapist

## 2021-09-29 NOTE — DIETITIAN INITIAL EVALUATION ADULT. - OTHER INFO
Pt admitted for depression. PMHx includes Asthma, ADHD. reports fair appetite/po intake at present. No GI distress noted. Food preferences taken and implemented. Will add nutrient dense snacks into Pt's menu. Encouraged po intake. Pt verbalized understanding.

## 2021-09-29 NOTE — BH INPATIENT PSYCHIATRY ASSESSMENT NOTE - CURRENT MEDICATION
MEDICATIONS  (STANDING):  buPROPion XL (24-Hour) 150 milliGRAM(s) Oral daily  influenza   Vaccine 0.5 milliLiter(s) IntraMuscular once  LORazepam     Tablet 0.5 milliGRAM(s) Oral once  melatonin. 5 milliGRAM(s) Oral at bedtime    MEDICATIONS  (PRN):  acetaminophen   Tablet .. 650 milliGRAM(s) Oral every 6 hours PRN Mild Pain (1 - 3), Moderate Pain (4 - 6)  bisacodyl 5 milliGRAM(s) Oral every 12 hours PRN Constipation  diphenhydrAMINE 50 milliGRAM(s) Oral every 6 hours PRN anxiety or insomnia  diphenhydrAMINE   Injectable 50 milliGRAM(s) IntraMuscular every 6 hours PRN Agitation  haloperidol     Tablet 5 milliGRAM(s) Oral every 6 hours PRN agitation  haloperidol    Injectable 5 milliGRAM(s) IntraMuscular once PRN agitation  LORazepam     Tablet 2 milliGRAM(s) Oral every 6 hours PRN agitation  LORazepam     Tablet 0.5 milliGRAM(s) Oral every 12 hours PRN anxiety  LORazepam   Injectable 2 milliGRAM(s) IntraMuscular once PRN agitation

## 2021-09-29 NOTE — BH INPATIENT PSYCHIATRY ASSESSMENT NOTE - MSE UNSTRUCTURED FT
Age appearing, casually dressed, appropriate hygiene and grooming, behaviorally calm cooperative and pleasant, speech is soft with normal rate rhythm and spontaneous. Mood "sad." Affect constricted, appropriate for context, mood congruent. Thought process, linear organized and goal directed. Thought content reality based with passive SI but not active plan or intent. No obsessions or ruminations noted. Denies HI. Denies AVH not internally preoccupied. no paranoia expressed. no delusions. Insight good into mental illness, judgement appropriate to seek treatment, impulse control appropriate at this time. No psychomotor changes. AAOx3 and cognitively grossly intact

## 2021-09-29 NOTE — BH INPATIENT PSYCHIATRY ASSESSMENT NOTE - DETAILS
mother with depression  reports stresses of life overwhelming to point where he wants to jump out of windows, those feelings have subsided and now has passive SI but no intent or plan and able to contract for safety and reach out to staff if worsening suicidal thoughts

## 2021-09-29 NOTE — CHART NOTE - NSCHARTNOTEFT_GEN_A_CORE
Pt is a 31year old male who is currently admitted at [facility] . Auth # [id number] obtained from [facility] for 9days. LOIDA provided with the auth #.

## 2021-09-29 NOTE — BH INPATIENT PSYCHIATRY ASSESSMENT NOTE - HPI (INCLUDE ILLNESS QUALITY, SEVERITY, DURATION, TIMING, CONTEXT, MODIFYING FACTORS, ASSOCIATED SIGNS AND SYMPTOMS)
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 Denies substance use history. No formal medical history. Patient was bib  as pt feeling more depressed with SI to jump out of the window. Legal status Voluntary    Patient states that over the course of the last several months, he noticed worsening depressive and anxious symptoms attributes this to school restarted. States that he chronically suffers from depression and anxiety but school starting was a major precipitant. Patient reports expressing low mood, poor sleep, anhedonia, poor concentration, low energy, and suicidal thoughts including jumping out a window. Patient also endorses chronic worries and also expresses having poor coping skills. Patient denies symptoms of psychosis including AVH or paranoia. Patient denies manic symptoms including racing thoughts, impulsivity and engaging in high risk behavior, decreased need for sleep, pressured speech. Patient states that last week he saw a psych NP who prescribed him Adderall 5mg BID which appears to have made his anxiety and depressive symptoms much worse.     Patient states struggling with gender identify that was lifelong and appx 6 years ago, pursued hormone therapy which he did for 2 years afterwards stopping. Did not have any surgical interventions. States that gender dysphoria has been a chronic struggle though his family has been quite accepting and that is relieving to him. Patient also endorses having PCOS and GYN prescribed him Metformin which has not been very effective in regulating cycle and he has not tried birth control or spironolactone.     We discuss trial of SSRI but patient reports due to poor efficacy in prior trials (sertraline, escitalopram), that he would like to try other antidepressants; also cites sexual dysfunction as a reason to not want to try SSRI. Patient wishes to try Welbutrin- we discuss risks benefits alternatives, side effects and discuss that while Welbutrin would not be my first choice given anxiety sx, it can certainly be tried while inpatient. Patient amenable to this.

## 2021-09-30 LAB
COVID-19 SPIKE DOMAIN AB INTERP: POSITIVE
COVID-19 SPIKE DOMAIN ANTIBODY RESULT: >250 U/ML — HIGH
SARS-COV-2 IGG+IGM SERPL QL IA: >250 U/ML — HIGH
SARS-COV-2 IGG+IGM SERPL QL IA: POSITIVE

## 2021-09-30 PROCEDURE — 99231 SBSQ HOSP IP/OBS SF/LOW 25: CPT

## 2021-09-30 RX ADMIN — BUPROPION HYDROCHLORIDE 150 MILLIGRAM(S): 150 TABLET, EXTENDED RELEASE ORAL at 08:39

## 2021-09-30 RX ADMIN — Medication 5 MILLIGRAM(S): at 21:24

## 2021-10-01 PROCEDURE — 99231 SBSQ HOSP IP/OBS SF/LOW 25: CPT

## 2021-10-01 RX ADMIN — Medication 5 MILLIGRAM(S): at 21:53

## 2021-10-01 RX ADMIN — BUPROPION HYDROCHLORIDE 150 MILLIGRAM(S): 150 TABLET, EXTENDED RELEASE ORAL at 09:16

## 2021-10-01 NOTE — BH TREATMENT PLAN - NSTXDCOPLKINTERSW_PSY_ALL_CORE
SW spoke with pt to discuss barrier, challenges, pt states needing assistance with finding a prescriber for medication, pt has a therapist but needs additional support, SW to explore possible referrals with pt

## 2021-10-01 NOTE — BH TREATMENT PLAN - NSTXSUICIDINTERRN_PSY_ALL_CORE
Assess pt's mood and for S/I/I/P and SIB, explore healthy coping skills and protective factors, provide support, maintain safety, encourage coping skills and participation in group and unit activities
Pt encouraged to speak to staff if thoughts of suicide or self injurious behavior arises. Pt encouraged to utilize coping strategies.

## 2021-10-01 NOTE — BH TREATMENT PLAN - ANXIETY/PANIC/FEAR NURSING INTERVENTIONS/RECOMMENDATIONS
Pt encouraged to verbalize any feelings of anxiety, pt educated on identifying signs and symptoms of anxiety. Pt encouraged to utilize effective coping strategies.

## 2021-10-01 NOTE — BH TREATMENT PLAN - NSDCCRITERIA_PSY_ALL_CORE
When pt is no longer an acute or imminent risk of harm to self or others, and is able to care for self safely, pt may then be discharged. CGI </=3 
When pt is no longer an acute or imminent risk of harm to self or others, and is able to care for self safely, pt may then be discharged. CGI </=3

## 2021-10-01 NOTE — BH TREATMENT PLAN - NSBHPRIMARYDX_PSY_ALL_CORE
Major depressive disorder, recurrent episode, severe    
Major depressive disorder, recurrent episode, severe

## 2021-10-02 PROCEDURE — 99232 SBSQ HOSP IP/OBS MODERATE 35: CPT

## 2021-10-02 RX ADMIN — BUPROPION HYDROCHLORIDE 150 MILLIGRAM(S): 150 TABLET, EXTENDED RELEASE ORAL at 08:30

## 2021-10-02 RX ADMIN — Medication 5 MILLIGRAM(S): at 21:12

## 2021-10-03 PROCEDURE — 99232 SBSQ HOSP IP/OBS MODERATE 35: CPT

## 2021-10-03 RX ADMIN — BUPROPION HYDROCHLORIDE 150 MILLIGRAM(S): 150 TABLET, EXTENDED RELEASE ORAL at 08:26

## 2021-10-03 RX ADMIN — Medication 5 MILLIGRAM(S): at 21:26

## 2021-10-03 NOTE — BH INPATIENT PSYCHIATRY PROGRESS NOTE - NSCGIIMPROVESX_PSY_ALL_CORE
4 = No change - symptoms remain essentially unchanged

## 2021-10-04 PROCEDURE — 99232 SBSQ HOSP IP/OBS MODERATE 35: CPT

## 2021-10-04 RX ORDER — BUPROPION HYDROCHLORIDE 150 MG/1
300 TABLET, EXTENDED RELEASE ORAL DAILY
Refills: 0 | Status: DISCONTINUED | OUTPATIENT
Start: 2021-10-05 | End: 2021-10-08

## 2021-10-04 RX ADMIN — BUPROPION HYDROCHLORIDE 150 MILLIGRAM(S): 150 TABLET, EXTENDED RELEASE ORAL at 08:30

## 2021-10-04 RX ADMIN — Medication 5 MILLIGRAM(S): at 21:05

## 2021-10-04 RX ADMIN — Medication 2 MILLIGRAM(S): at 12:24

## 2021-10-05 PROCEDURE — 99231 SBSQ HOSP IP/OBS SF/LOW 25: CPT

## 2021-10-05 RX ADMIN — Medication 5 MILLIGRAM(S): at 21:27

## 2021-10-05 RX ADMIN — BUPROPION HYDROCHLORIDE 300 MILLIGRAM(S): 150 TABLET, EXTENDED RELEASE ORAL at 09:33

## 2021-10-06 PROCEDURE — 99231 SBSQ HOSP IP/OBS SF/LOW 25: CPT

## 2021-10-06 RX ADMIN — BUPROPION HYDROCHLORIDE 300 MILLIGRAM(S): 150 TABLET, EXTENDED RELEASE ORAL at 09:19

## 2021-10-06 RX ADMIN — Medication 5 MILLIGRAM(S): at 21:38

## 2021-10-06 NOTE — BH SAFETY PLAN - INTERNAL COPING STRATEGY 3
Problem: Fluid Volume - Imbalance:  Goal: Absence of imbalanced fluid volume signs and symptoms  Description: Absence of imbalanced fluid volume signs and symptoms  Outcome: Completed  Goal: Absence of intrapartum hemorrhage signs and symptoms  Description: Absence of intrapartum hemorrhage signs and symptoms  Outcome: Completed     Problem: Infection - Intrapartum Infection:  Goal: Will show no infection signs and symptoms  Description: Will show no infection signs and symptoms  Outcome: Met This Shift     Problem: Labor Process - Prolonged:  Goal: Labor progression, first stage, within specified pattern  Description: Labor progression, first stage, within specified pattern  Outcome: Completed  Goal: Labor progession, second stage, within specified pattern  Description: Labor progession, second stage, within specified pattern  Outcome: Completed  Goal: Uterine contractions within specified parameters  Description: Uterine contractions within specified parameters  Outcome: Completed     Problem:  Screening:  Goal: Ability to make informed decisions regarding treatment has improved  Description: Ability to make informed decisions regarding treatment has improved  Outcome: Completed     Problem: Pain - Acute:  Goal: Pain level will decrease  Description: Pain level will decrease  Outcome: Met This Shift  Goal: Able to cope with pain  Description: Able to cope with pain  Outcome: Met This Shift     Problem: Urinary Retention:  Goal: Experiences of bladder distention will decrease  Description: Experiences of bladder distention will decrease  Outcome: Completed  Goal: Urinary elimination within specified parameters  Description: Urinary elimination within specified parameters  Outcome: Completed     Problem: Pain:  Description: Pain management should include both nonpharmacologic and pharmacologic interventions.   Goal: Pain level will decrease  Description: Pain level will decrease  Outcome: Met This Shift  Goal: Control of acute pain  Description: Control of acute pain  Outcome: Met This Shift Drawing

## 2021-10-07 PROCEDURE — 99231 SBSQ HOSP IP/OBS SF/LOW 25: CPT

## 2021-10-07 RX ADMIN — Medication 5 MILLIGRAM(S): at 21:05

## 2021-10-07 RX ADMIN — BUPROPION HYDROCHLORIDE 300 MILLIGRAM(S): 150 TABLET, EXTENDED RELEASE ORAL at 09:26

## 2021-10-08 VITALS — TEMPERATURE: 97 F

## 2021-10-08 PROCEDURE — 99238 HOSP IP/OBS DSCHRG MGMT 30/<: CPT

## 2021-10-08 RX ORDER — BUPROPION HYDROCHLORIDE 150 MG/1
1 TABLET, EXTENDED RELEASE ORAL
Qty: 14 | Refills: 0
Start: 2021-10-08 | End: 2021-10-21

## 2021-10-08 RX ADMIN — BUPROPION HYDROCHLORIDE 300 MILLIGRAM(S): 150 TABLET, EXTENDED RELEASE ORAL at 08:27

## 2021-10-08 NOTE — BH INPATIENT PSYCHIATRY PROGRESS NOTE - NSICDXBHTERTIARYDX_PSY_ALL_CORE
R/O Gender dysphoria   F64.9  

## 2021-10-08 NOTE — BH INPATIENT PSYCHIATRY PROGRESS NOTE - NSBHATTESTSEENBY_PSY_A_CORE
attending Psychiatrist without NP/Trainee
NP without Attending Psychiatrist
attending Psychiatrist without NP/Trainee
NP without Attending Psychiatrist
attending Psychiatrist without NP/Trainee

## 2021-10-08 NOTE — BH INPATIENT PSYCHIATRY DISCHARGE NOTE - NSDCRECOMMENDMEDICALFT_PSY_ALL_CORE
-Please take medications as reconciled  -Please follow up with your primary care doctor as necessary  -Please follow up with your psychiatric appointment as detailed  -Please go to nearest ED or call [phone] in case of psychiatric or medical emergency.

## 2021-10-08 NOTE — BH DISCHARGE NOTE NURSING/SOCIAL WORK/PSYCH REHAB - DISCHARGE INSTRUCTIONS AFTERCARE APPOINTMENTS
In order to check the location, date, or time of your aftercare appointment, please refer to your Discharge Instructions Document given to you upon leaving the hospital.  If you have lost the instructions please call [phone]

## 2021-10-08 NOTE — BH INPATIENT PSYCHIATRY PROGRESS NOTE - NSTXANXGOAL_PSY_ALL_CORE
Identify and practice 3 coping skills to manage anxiety

## 2021-10-08 NOTE — BH INPATIENT PSYCHIATRY PROGRESS NOTE - NSBHMETABOLIC_PSY_ALL_CORE_FT
BMI: BMI (kg/m2): 25.8 (09-28-21 @ 17:10)  HbA1c:   Glucose:   BP: --  Lipid Panel: 
BMI: BMI (kg/m2): 25.8 (09-28-21 @ 17:10)  HbA1c:   Glucose:   BP: --  Lipid Panel: 
BMI: BMI (kg/m2): 25.8 (09-28-21 @ 17:10)  HbA1c:   Glucose:   BP: 95/63 (10-01-21 @ 07:43) (95/63 - 95/63)  Lipid Panel: 
BMI: BMI (kg/m2): 25.8 (09-28-21 @ 17:10)  HbA1c:   Glucose:   BP: --  Lipid Panel: 
BMI: BMI (kg/m2): 25.8 (09-28-21 @ 17:10)  HbA1c:   Glucose:   BP: 95/63 (10-01-21 @ 07:43) (95/63 - 95/63)  Lipid Panel: 
BMI: BMI (kg/m2): 25.8 (09-28-21 @ 17:10)  HbA1c:   Glucose:   BP: --  Lipid Panel: 
BMI: BMI (kg/m2): 25.8 (09-28-21 @ 17:10)  HbA1c:   Glucose:   BP: 95/63 (10-01-21 @ 07:43) (95/63 - 95/63)  Lipid Panel:

## 2021-10-08 NOTE — BH DISCHARGE NOTE NURSING/SOCIAL WORK/PSYCH REHAB - NSCDUDCCRISIS_PSY_A_CORE
[facility]  1 [phone]) [facility]-WELL ([phone])  Text "WELL" to 90407  Website: www.[employer]/.[facility] 1 (588) 241-CFSM (8683) Website: www.safehorizon.org/.National Suicide Prevention Lifeline 5 (530) 754-4853/.  [facility]  1 (930) LIF[facility] (367-8635)/.  [facility]  [phone] [place] [id number] [phone]   Hours:  Monday through Friday from 9 AM to 3 PM/.  [employer] [employer]  [employer] - [facility] Crisis Line  4 [phone], Option 1

## 2021-10-08 NOTE — BH DISCHARGE NOTE NURSING/SOCIAL WORK/PSYCH REHAB - NSDCPRGOAL_PSY_ALL_CORE
During the current hospitalization, patient has been working on psychiatric rehabilitation goals pertaining to identifying and utilizing 3 coping skills to manage anxiety. Patient has made good progress. Pt identified coping skills of mindfulness, creative arts, and "putting out content." Patient is currently assessed with a linear thought process with good insight and judgment. Patient has been observed behaviorally appropriate in the environment. Pt is social on the unit with staff and peers. Patient reported daily medication compliance. Pt denied SI/HI/AH/VH. Patient reports motivation for discharge and to go home to his  and dogs.

## 2021-10-08 NOTE — BH INPATIENT PSYCHIATRY PROGRESS NOTE - NSBHCHARTREVIEWVS_PSY_A_CORE FT
Vital Signs Last 24 Hrs  T(C): 36.6 (10-01-21 @ 07:43), Max: 36.6 (10-01-21 @ 07:43)  T(F): 97.8 (10-01-21 @ 07:43), Max: 97.8 (10-01-21 @ 07:43)  HR: 78 (10-01-21 @ 07:43) (78 - 78)  BP: 95/63 (10-01-21 @ 07:43) (95/63 - 95/63)  BP(mean): --  RR: --  SpO2: --    Orthostatic VS  09-30-21 @ 07:34  Lying BP: --/-- HR: --  Sitting BP: 96/68 HR: 83  Standing BP: --/-- HR: --  Site: --  Mode: --  
Vital Signs Last 24 Hrs  T(C): 36.2 (10-04-21 @ 08:26), Max: 36.6 (10-03-21 @ 18:33)  T(F): 97.1 (10-04-21 @ 08:26), Max: 97.8 (10-03-21 @ 18:33)  HR: --  BP: --  BP(mean): --  RR: --  SpO2: --    Orthostatic VS  10-04-21 @ 08:26  Lying BP: --/-- HR: --  Sitting BP: 107/61 HR: 86  Standing BP: --/-- HR: --  Site: --  Mode: --  Orthostatic VS  10-03-21 @ 08:16  Lying BP: --/-- HR: --  Sitting BP: 105/70 HR: 78  Standing BP: --/-- HR: --  Site: --  Mode: --  
Vital Signs Last 24 Hrs  T(C): 36.4 (10-07-21 @ 08:14), Max: 36.9 (10-06-21 @ 19:11)  T(F): 97.6 (10-07-21 @ 08:14), Max: 98.4 (10-06-21 @ 19:11)  HR: --  BP: --  BP(mean): --  RR: --  SpO2: --    Orthostatic VS  10-07-21 @ 08:14  Lying BP: --/-- HR: --  Sitting BP: 105/65 HR: 92  Standing BP: --/-- HR: --  Site: --  Mode: --  Orthostatic VS  10-06-21 @ 08:18  Lying BP: --/-- HR: --  Sitting BP: 102/71 HR: 92  Standing BP: --/-- HR: --  Site: --  Mode: --  
Vital Signs Last 24 Hrs  T(C): 36.3 (10-05-21 @ 06:45), Max: 36.3 (10-05-21 @ 06:45)  T(F): 97.4 (10-05-21 @ 06:45), Max: 97.4 (10-05-21 @ 06:45)  HR: --  BP: --  BP(mean): --  RR: --  SpO2: --    Orthostatic VS  10-05-21 @ 06:45  Lying BP: --/-- HR: --  Sitting BP: 117/69 HR: 95  Standing BP: --/-- HR: --  Site: --  Mode: --  Orthostatic VS  10-04-21 @ 08:26  Lying BP: --/-- HR: --  Sitting BP: 107/61 HR: 86  Standing BP: --/-- HR: --  Site: --  Mode: --  
Vital Signs Last 24 Hrs  T(C): 36.4 (09-30-21 @ 07:34), Max: 37.2 (09-29-21 @ 17:23)  T(F): 97.6 (09-30-21 @ 07:34), Max: 98.9 (09-29-21 @ 17:23)  HR: --  BP: --  BP(mean): --  RR: --  SpO2: --    Orthostatic VS  09-30-21 @ 07:34  Lying BP: --/-- HR: --  Sitting BP: 96/68 HR: 83  Standing BP: --/-- HR: --  Site: --  Mode: --  Orthostatic VS  09-29-21 @ 07:36  Lying BP: --/-- HR: --  Sitting BP: 123/68 HR: 93  Standing BP: --/-- HR: --  Site: --  Mode: --  Orthostatic VS  09-28-21 @ 17:10  Lying BP: --/-- HR: --  Sitting BP: 122/75 HR: 88  Standing BP: 11/74 HR: 93  Site: --  Mode: --  
Vital Signs Last 24 Hrs  T(C): 36.7 (10-06-21 @ 08:18), Max: 36.7 (10-06-21 @ 08:18)  T(F): 98 (10-06-21 @ 08:18), Max: 98 (10-06-21 @ 08:18)  HR: --  BP: --  BP(mean): --  RR: --  SpO2: --    Orthostatic VS  10-06-21 @ 08:18  Lying BP: --/-- HR: --  Sitting BP: 102/71 HR: 92  Standing BP: --/-- HR: --  Site: --  Mode: --  Orthostatic VS  10-05-21 @ 06:45  Lying BP: --/-- HR: --  Sitting BP: 117/69 HR: 95  Standing BP: --/-- HR: --  Site: --  Mode: --  
Vital Signs Last 24 Hrs  T(C): 36.3 (10-08-21 @ 08:17), Max: 37.1 (10-07-21 @ 19:30)  T(F): 97.4 (10-08-21 @ 08:17), Max: 98.8 (10-07-21 @ 19:30)  HR: --  BP: --  BP(mean): --  RR: --  SpO2: --    Orthostatic VS  10-08-21 @ 08:17  Lying BP: --/-- HR: --  Sitting BP: 107/63 HR: 91  Standing BP: --/-- HR: --  Site: --  Mode: --  Orthostatic VS  10-07-21 @ 08:14  Lying BP: --/-- HR: --  Sitting BP: 105/65 HR: 92  Standing BP: --/-- HR: --  Site: --  Mode: --  
Vital Signs Last 24 Hrs  T(C): 36.1 (10-02-21 @ 08:15), Max: 37.1 (10-01-21 @ 17:17)  T(F): 96.9 (10-02-21 @ 08:15), Max: 98.7 (10-01-21 @ 17:17)  HR: --  BP: --  BP(mean): --  RR: 18 (10-02-21 @ 08:15) (18 - 18)  SpO2: --    Orthostatic VS  10-02-21 @ 08:15  Lying BP: --/-- HR: --  Sitting BP: 103/63 HR: 79  Standing BP: --/-- HR: --  Site: upper right arm  Mode: electronic  
Vital Signs Last 24 Hrs  T(C): 36.6 (10-03-21 @ 08:16), Max: 36.8 (10-02-21 @ 17:32)  T(F): 97.8 (10-03-21 @ 08:16), Max: 98.2 (10-02-21 @ 17:32)  HR: --  BP: --  BP(mean): --  RR: --  SpO2: --    Orthostatic VS  10-03-21 @ 08:16  Lying BP: --/-- HR: --  Sitting BP: 105/70 HR: 78  Standing BP: --/-- HR: --  Site: --  Mode: --  Orthostatic VS  10-02-21 @ 08:15  Lying BP: --/-- HR: --  Sitting BP: 103/63 HR: 79  Standing BP: --/-- HR: --  Site: upper right arm  Mode: electronic

## 2021-10-08 NOTE — BH INPATIENT PSYCHIATRY PROGRESS NOTE - NSTXDCOPLKINTERMD_PSY_ALL_CORE
coordinate with s/w 

## 2021-10-08 NOTE — BH INPATIENT PSYCHIATRY DISCHARGE NOTE - DESCRIPTION
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

## 2021-10-08 NOTE — BH INPATIENT PSYCHIATRY DISCHARGE NOTE - NSDCCPCAREPLAN_GEN_ALL_CORE_FT
PRINCIPAL DISCHARGE DIAGNOSIS  Diagnosis: Major depressive disorder, recurrent episode, severe  Assessment and Plan of Treatment: meds + therapy

## 2021-10-08 NOTE — BH INPATIENT PSYCHIATRY PROGRESS NOTE - CURRENT MEDICATION
MEDICATIONS  (STANDING):  buPROPion XL (24-Hour) 150 milliGRAM(s) Oral daily  influenza   Vaccine 0.5 milliLiter(s) IntraMuscular once  melatonin. 5 milliGRAM(s) Oral at bedtime    MEDICATIONS  (PRN):  acetaminophen   Tablet .. 650 milliGRAM(s) Oral every 6 hours PRN Mild Pain (1 - 3), Moderate Pain (4 - 6)  bisacodyl 5 milliGRAM(s) Oral every 12 hours PRN Constipation  diphenhydrAMINE 50 milliGRAM(s) Oral every 6 hours PRN anxiety or insomnia  diphenhydrAMINE   Injectable 50 milliGRAM(s) IntraMuscular every 6 hours PRN Agitation  haloperidol     Tablet 5 milliGRAM(s) Oral every 6 hours PRN agitation  haloperidol    Injectable 5 milliGRAM(s) IntraMuscular once PRN agitation  LORazepam     Tablet 2 milliGRAM(s) Oral every 6 hours PRN agitation  LORazepam     Tablet 0.5 milliGRAM(s) Oral every 12 hours PRN anxiety  LORazepam   Injectable 2 milliGRAM(s) IntraMuscular once PRN agitation  
MEDICATIONS  (STANDING):  buPROPion XL (24-Hour) 300 milliGRAM(s) Oral daily  influenza   Vaccine 0.5 milliLiter(s) IntraMuscular once  melatonin. 5 milliGRAM(s) Oral at bedtime    MEDICATIONS  (PRN):  acetaminophen   Tablet .. 650 milliGRAM(s) Oral every 6 hours PRN Mild Pain (1 - 3), Moderate Pain (4 - 6)  bisacodyl 5 milliGRAM(s) Oral every 12 hours PRN Constipation  diphenhydrAMINE 50 milliGRAM(s) Oral every 6 hours PRN anxiety or insomnia  diphenhydrAMINE   Injectable 50 milliGRAM(s) IntraMuscular every 6 hours PRN Agitation  haloperidol     Tablet 5 milliGRAM(s) Oral every 6 hours PRN agitation  haloperidol    Injectable 5 milliGRAM(s) IntraMuscular once PRN agitation  LORazepam     Tablet 2 milliGRAM(s) Oral every 6 hours PRN agitation  LORazepam     Tablet 0.5 milliGRAM(s) Oral every 12 hours PRN anxiety  LORazepam   Injectable 2 milliGRAM(s) IntraMuscular once PRN agitation  
MEDICATIONS  (STANDING):  buPROPion XL (24-Hour) 300 milliGRAM(s) Oral daily  influenza   Vaccine 0.5 milliLiter(s) IntraMuscular once  melatonin. 5 milliGRAM(s) Oral at bedtime    MEDICATIONS  (PRN):  acetaminophen   Tablet .. 650 milliGRAM(s) Oral every 6 hours PRN Mild Pain (1 - 3), Moderate Pain (4 - 6)  bisacodyl 5 milliGRAM(s) Oral every 12 hours PRN Constipation  diphenhydrAMINE 50 milliGRAM(s) Oral every 6 hours PRN anxiety or insomnia  diphenhydrAMINE   Injectable 50 milliGRAM(s) IntraMuscular every 6 hours PRN Agitation  haloperidol     Tablet 5 milliGRAM(s) Oral every 6 hours PRN agitation  haloperidol    Injectable 5 milliGRAM(s) IntraMuscular once PRN agitation  LORazepam     Tablet 2 milliGRAM(s) Oral every 6 hours PRN agitation  LORazepam     Tablet 0.5 milliGRAM(s) Oral every 12 hours PRN anxiety  LORazepam   Injectable 2 milliGRAM(s) IntraMuscular once PRN agitation  
MEDICATIONS  (STANDING):  buPROPion XL (24-Hour) 150 milliGRAM(s) Oral daily  influenza   Vaccine 0.5 milliLiter(s) IntraMuscular once  melatonin. 5 milliGRAM(s) Oral at bedtime    MEDICATIONS  (PRN):  acetaminophen   Tablet .. 650 milliGRAM(s) Oral every 6 hours PRN Mild Pain (1 - 3), Moderate Pain (4 - 6)  bisacodyl 5 milliGRAM(s) Oral every 12 hours PRN Constipation  diphenhydrAMINE 50 milliGRAM(s) Oral every 6 hours PRN anxiety or insomnia  diphenhydrAMINE   Injectable 50 milliGRAM(s) IntraMuscular every 6 hours PRN Agitation  haloperidol     Tablet 5 milliGRAM(s) Oral every 6 hours PRN agitation  haloperidol    Injectable 5 milliGRAM(s) IntraMuscular once PRN agitation  LORazepam     Tablet 2 milliGRAM(s) Oral every 6 hours PRN agitation  LORazepam     Tablet 0.5 milliGRAM(s) Oral every 12 hours PRN anxiety  LORazepam   Injectable 2 milliGRAM(s) IntraMuscular once PRN agitation  
MEDICATIONS  (STANDING):  buPROPion XL (24-Hour) 300 milliGRAM(s) Oral daily  influenza   Vaccine 0.5 milliLiter(s) IntraMuscular once  melatonin. 5 milliGRAM(s) Oral at bedtime    MEDICATIONS  (PRN):  acetaminophen   Tablet .. 650 milliGRAM(s) Oral every 6 hours PRN Mild Pain (1 - 3), Moderate Pain (4 - 6)  bisacodyl 5 milliGRAM(s) Oral every 12 hours PRN Constipation  diphenhydrAMINE 50 milliGRAM(s) Oral every 6 hours PRN anxiety or insomnia  diphenhydrAMINE   Injectable 50 milliGRAM(s) IntraMuscular every 6 hours PRN Agitation  haloperidol     Tablet 5 milliGRAM(s) Oral every 6 hours PRN agitation  haloperidol    Injectable 5 milliGRAM(s) IntraMuscular once PRN agitation  LORazepam     Tablet 2 milliGRAM(s) Oral every 6 hours PRN agitation  LORazepam     Tablet 0.5 milliGRAM(s) Oral every 12 hours PRN anxiety  LORazepam   Injectable 2 milliGRAM(s) IntraMuscular once PRN agitation  
MEDICATIONS  (STANDING):  buPROPion XL (24-Hour) 300 milliGRAM(s) Oral daily  influenza   Vaccine 0.5 milliLiter(s) IntraMuscular once  melatonin. 5 milliGRAM(s) Oral at bedtime    MEDICATIONS  (PRN):  acetaminophen   Tablet .. 650 milliGRAM(s) Oral every 6 hours PRN Mild Pain (1 - 3), Moderate Pain (4 - 6)  bisacodyl 5 milliGRAM(s) Oral every 12 hours PRN Constipation  diphenhydrAMINE 50 milliGRAM(s) Oral every 6 hours PRN anxiety or insomnia  diphenhydrAMINE   Injectable 50 milliGRAM(s) IntraMuscular every 6 hours PRN Agitation  haloperidol     Tablet 5 milliGRAM(s) Oral every 6 hours PRN agitation  haloperidol    Injectable 5 milliGRAM(s) IntraMuscular once PRN agitation  LORazepam     Tablet 2 milliGRAM(s) Oral every 6 hours PRN agitation  LORazepam     Tablet 0.5 milliGRAM(s) Oral every 12 hours PRN anxiety  LORazepam   Injectable 2 milliGRAM(s) IntraMuscular once PRN agitation  
MEDICATIONS  (STANDING):  influenza   Vaccine 0.5 milliLiter(s) IntraMuscular once  melatonin. 5 milliGRAM(s) Oral at bedtime    MEDICATIONS  (PRN):  acetaminophen   Tablet .. 650 milliGRAM(s) Oral every 6 hours PRN Mild Pain (1 - 3), Moderate Pain (4 - 6)  bisacodyl 5 milliGRAM(s) Oral every 12 hours PRN Constipation  diphenhydrAMINE 50 milliGRAM(s) Oral every 6 hours PRN anxiety or insomnia  diphenhydrAMINE   Injectable 50 milliGRAM(s) IntraMuscular every 6 hours PRN Agitation  haloperidol     Tablet 5 milliGRAM(s) Oral every 6 hours PRN agitation  haloperidol    Injectable 5 milliGRAM(s) IntraMuscular once PRN agitation  LORazepam     Tablet 2 milliGRAM(s) Oral every 6 hours PRN agitation  LORazepam     Tablet 0.5 milliGRAM(s) Oral every 12 hours PRN anxiety  LORazepam   Injectable 2 milliGRAM(s) IntraMuscular once PRN agitation  
MEDICATIONS  (STANDING):  buPROPion XL (24-Hour) 150 milliGRAM(s) Oral daily  influenza   Vaccine 0.5 milliLiter(s) IntraMuscular once  melatonin. 5 milliGRAM(s) Oral at bedtime    MEDICATIONS  (PRN):  acetaminophen   Tablet .. 650 milliGRAM(s) Oral every 6 hours PRN Mild Pain (1 - 3), Moderate Pain (4 - 6)  bisacodyl 5 milliGRAM(s) Oral every 12 hours PRN Constipation  diphenhydrAMINE 50 milliGRAM(s) Oral every 6 hours PRN anxiety or insomnia  diphenhydrAMINE   Injectable 50 milliGRAM(s) IntraMuscular every 6 hours PRN Agitation  haloperidol     Tablet 5 milliGRAM(s) Oral every 6 hours PRN agitation  haloperidol    Injectable 5 milliGRAM(s) IntraMuscular once PRN agitation  LORazepam     Tablet 2 milliGRAM(s) Oral every 6 hours PRN agitation  LORazepam     Tablet 0.5 milliGRAM(s) Oral every 12 hours PRN anxiety  LORazepam   Injectable 2 milliGRAM(s) IntraMuscular once PRN agitation  
MEDICATIONS  (STANDING):  buPROPion XL (24-Hour) 150 milliGRAM(s) Oral daily  influenza   Vaccine 0.5 milliLiter(s) IntraMuscular once  melatonin. 5 milliGRAM(s) Oral at bedtime    MEDICATIONS  (PRN):  acetaminophen   Tablet .. 650 milliGRAM(s) Oral every 6 hours PRN Mild Pain (1 - 3), Moderate Pain (4 - 6)  bisacodyl 5 milliGRAM(s) Oral every 12 hours PRN Constipation  diphenhydrAMINE 50 milliGRAM(s) Oral every 6 hours PRN anxiety or insomnia  diphenhydrAMINE   Injectable 50 milliGRAM(s) IntraMuscular every 6 hours PRN Agitation  haloperidol     Tablet 5 milliGRAM(s) Oral every 6 hours PRN agitation  haloperidol    Injectable 5 milliGRAM(s) IntraMuscular once PRN agitation  LORazepam     Tablet 2 milliGRAM(s) Oral every 6 hours PRN agitation  LORazepam     Tablet 0.5 milliGRAM(s) Oral every 12 hours PRN anxiety  LORazepam   Injectable 2 milliGRAM(s) IntraMuscular once PRN agitation

## 2021-10-08 NOTE — BH INPATIENT PSYCHIATRY PROGRESS NOTE - NSTXDCOPLKPROGRES_PSY_ALL_CORE
No Change
Met - goal discontinued
No Change

## 2021-10-08 NOTE — BH INPATIENT PSYCHIATRY PROGRESS NOTE - NSTXSUICIDGOAL_PSY_ALL_CORE
Will identify and utilize 2 coping skills
Be able to state 3 reasons for living
Will identify and utilize 2 coping skills

## 2021-10-08 NOTE — BH INPATIENT PSYCHIATRY PROGRESS NOTE - MSE UNSTRUCTURED FT
Age appearing, casually dressed, appropriate hygiene and grooming, behaviorally calm cooperative and pleasant, speech is soft with normal rate rhythm and spontaneous. Mood "okay" Affect more reactive, appropriate for context, mood congruent. Thought process, linear organized and goal directed. Thought content reality based denies SI. No obsessions or ruminations noted. Denies HI. Denies AVH not internally preoccupied. no paranoia expressed. no delusions. Insight good into mental illness, judgement appropriate to seek treatment, impulse control appropriate at this time. No psychomotor changes. AAOx3 and cognitively grossly intact 
Age appearing, casually dressed, appropriate hygiene and grooming, behaviorally calm cooperative and pleasant, speech is soft with normal rate rhythm and spontaneous. Mood "sad." Affect constricted but more reactive, appropriate for context, mood congruent. Thought process, linear organized and goal directed. Thought content reality based with passive SI but not active plan or intent. No obsessions or ruminations noted. Denies HI. Denies AVH not internally preoccupied. no paranoia expressed. no delusions. Insight good into mental illness, judgement appropriate to seek treatment, impulse control appropriate at this time. No psychomotor changes. AAOx3 and cognitively grossly intact 
Age appearing, casually dressed, appropriate hygiene and grooming, behaviorally calm cooperative and pleasant, speech is soft with normal rate rhythm and spontaneous. Mood "stable" Affect more reactive, appropriate for context, mood congruent. Thought process, linear organized and goal directed. Thought content reality based denies SI. No obsessions or ruminations noted. Denies HI. Denies AVH not internally preoccupied. no paranoia expressed. no delusions. Insight good into mental illness, judgement appropriate to seek treatment, impulse control appropriate at this time. No psychomotor changes. AAOx3 and cognitively grossly intact 
Age appearing, casually dressed, appropriate hygiene and grooming, behaviorally calm cooperative and pleasant, speech is soft with normal rate rhythm and spontaneous. Mood "okay" Affect more reactive, appropriate for context, mood congruent. Thought process, linear organized and goal directed. Thought content reality based denies SI. No obsessions or ruminations noted. Denies HI. Denies AVH not internally preoccupied. no paranoia expressed. no delusions. Insight good into mental illness, judgement appropriate to seek treatment, impulse control appropriate at this time. No psychomotor changes. AAOx3 and cognitively grossly intact 
Age appearing, casually dressed, appropriate hygiene and grooming, behaviorally calm cooperative and pleasant, speech is soft with normal rate rhythm and spontaneous. Mood "sad." Affect constricted but more reactive, appropriate for context, mood congruent. Thought process, linear organized and goal directed. Thought content reality based with passive SI but not active plan or intent. No obsessions or ruminations noted. Denies HI. Denies AVH not internally preoccupied. no paranoia expressed. no delusions. Insight good into mental illness, judgement appropriate to seek treatment, impulse control appropriate at this time. No psychomotor changes. AAOx3 and cognitively grossly intact 
Age appearing, casually dressed, appropriate hygiene and grooming, behaviorally calm cooperative and pleasant, speech is soft with normal rate rhythm and spontaneous. Mood "okay" Affect more reactive, appropriate for context, mood congruent. Thought process, linear organized and goal directed. Thought content reality based denies SI. No obsessions or ruminations noted. Denies HI. Denies AVH not internally preoccupied. no paranoia expressed. no delusions. Insight good into mental illness, judgement appropriate to seek treatment, impulse control appropriate at this time. No psychomotor changes. AAOx3 and cognitively grossly intact 
Age appearing, casually dressed, appropriate hygiene and grooming, behaviorally calm cooperative and pleasant, speech is soft with normal rate rhythm and spontaneous. Mood "good" Affect more reactive, appropriate for context, mood congruent. Thought process, linear organized and goal directed. Thought content reality based denies SI. No obsessions or ruminations noted. Denies HI. Denies AVH not internally preoccupied. no paranoia expressed. no delusions. Insight good into mental illness, judgement appropriate to seek treatment, impulse control appropriate at this time. No psychomotor changes. AAOx3 and cognitively grossly intact

## 2021-10-08 NOTE — BH INPATIENT PSYCHIATRY PROGRESS NOTE - MSE OPTIONS
Unstructured MSE
Structured MSE
Unstructured MSE
Structured MSE

## 2021-10-08 NOTE — BH PSYCHOLOGY - CLINICIAN PSYCHOTHERAPY NOTE - NSBHPSYCHOLNARRATIVE_PSY_A_CORE FT
Writer and patient wore masks during the session due to COVID precautions. Pt was alert and presented with adequate hygiene and grooming. Pt denied any current suicidal ideation, intent or plan and shared feeling guilty about his previous ideation and plan. Pt did not endorse any HI. Pt speech was wnl, thought process was linear and goal directed. Pt denied any A/V hallucination. Oriented X3. Parameters of treatment and limitations of confidentiality were discussed. Pt demonstrated fair insight and judgement.  During this session pt talked about precipitating events leading up to his anxiety and depression. Pt shared interpersonal issues at work that exacerbated his symptoms. He talked about his work-related stressors and his recent diagnosis of ADHD. Writer provided psychoeducation and how anxiety can lead to many symptoms that mimic ADHD symptoms. Pt demonstrated knowledge and appeared interested in CBT interventions. Writer shared techniques such as making SMART goals for immediate tasks, timing breaks and time-outs for self, making a to-do list and using de-stressing techniques to release anxiety. Pt identified his  as his biggest support and also shared having an active and supportive friend [place].   
Writer and patient wore masks during the session due to COVID precautions. Pt was alert and presented with adequate hygiene and grooming. Pt denied any current suicidal ideation, intent or plan. Pt did not endorse any HI. Pt speech was wnl, thought process was linear and goal directed. Pt denied any A/V hallucination. Oriented X3. Parameters of treatment and limitations of confidentiality were discussed. Pt demonstrated fair insight and judgement.  During this session pt talked about his previous experiences with bullying and discrimination and grappling with his symptoms. Pt shared that he was not diagnosed with ADHD until recently and had to compensate for the symptoms that went untreated. Pt talked about how his symptoms get in the way of being productive, leading to procrastination, further worsening his anxiety. Writer provided psychoeducation on ADHD and anxiety and ways in which some of the symptoms can overlap. Pt also shared that he maybe possibly on the spectrum as he was a delayed speaker and always had trouble engaging in social settings. Pt was interested in getting further neuropsychological testing specific to ASD from an outpatient practice. Pt and writer went over strategies that will improve his executive functioning as well as relieve his anxiety. 
Writer and patient wore masks during the session due to COVID precautions. Pt was alert and presented with adequate hygiene and grooming. Pt denied any current suicidal ideation, intent or plan and was future oriented during the session. Pt did not endorse any HI. Pt speech was wnl, thought process was linear and goal directed. Pt denied any A/V hallucination. Oriented X3. Parameters of treatment and limitations of confidentiality were discussed. Pt demonstrated good insight and judgement.  During this session pt talked about her plans post discharge. Writer and pt talked in details about how she can organize her plans and prioritize tasks in a way that does not overwhelm him and lead to avoidance behavior. Pt and writer revisited precipitating events to draw strengths from past experiences and find warning signs to prevent future episodes of suicidal thoughts. Pt was future oriented and insightful. He went over his support system and coping skills that he can use before his anxiety escalates.

## 2021-10-08 NOTE — BH INPATIENT PSYCHIATRY PROGRESS NOTE - NSTXSUICIDINTERMD_PSY_ALL_CORE
meds + therapy 

## 2021-10-08 NOTE — BH INPATIENT PSYCHIATRY DISCHARGE NOTE - HPI (INCLUDE ILLNESS QUALITY, SEVERITY, DURATION, TIMING, CONTEXT, MODIFYING FACTORS, ASSOCIATED SIGNS AND SYMPTOMS)
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 Denies substance use history. No formal medical history. Patient was bib  as pt feeling more depressed with SI to jump out of the window. Legal status Voluntary    Patient states that over the course of the last several months, he noticed worsening depressive and anxious symptoms attributes this to school restarted. States that he chronically suffers from depression and anxiety but school starting was a major precipitant. Patient reports expressing low mood, poor sleep, anhedonia, poor concentration, low energy, and suicidal thoughts including jumping out a window. Patient also endorses chronic worries and also expresses having poor coping skills. Patient denies symptoms of psychosis including AVH or paranoia. Patient denies manic symptoms including racing thoughts, impulsivity and engaging in high risk behavior, decreased need for sleep, pressured speech. Patient states that last week he saw a psych NP who prescribed him Adderall 5mg BID which appears to have made his anxiety and depressive symptoms much worse.     Patient states struggling with gender identify that was lifelong and appx 6 years ago, pursued hormone therapy which he did for 2 years afterwards stopping. Did not have any surgical interventions. States that gender dysphoria has been a chronic struggle though his family has been quite accepting and that is relieving to him. Patient also endorses having PCOS and GYN prescribed him Metformin which has not been very effective in regulating cycle and he has not tried birth control or spironolactone.     We discuss trial of SSRI but patient reports due to poor efficacy in prior trials (sertraline, escitalopram), that he would like to try other antidepressants; also cites sexual dysfunction as a reason to not want to try SSRI. Patient wishes to try Welbutrin- we discuss risks benefits alternatives, side effects and discuss that while Welbutrin would not be my first choice given anxiety sx, it can certainly be tried while inpatient. Patient amenable to this.

## 2021-10-08 NOTE — BH PSYCHOLOGY - CLINICIAN PSYCHOTHERAPY NOTE - NSBHPSYCHOLPROBS_PSY_ALL_CORE
Academic/Vocational/Social Dysfunction/Anxiety

## 2021-10-08 NOTE — BH INPATIENT PSYCHIATRY PROGRESS NOTE - NSTXANXPROGRES_PSY_ALL_CORE
Improving
No Change
Improving
Met - goal discontinued
Improving

## 2021-10-08 NOTE — BH INPATIENT PSYCHIATRY PROGRESS NOTE - NSTXANXDATEEST_PSY_ALL_CORE
-- DO NOT REPLY / DO NOT REPLY ALL --  -- Message is from the [facility]--    General Patient Message      Reason for Call: nurse called toget pre op clearance appointment notes lab and imaging  And surgical clearance faxed to [phone]    Caller Information       Type Contact Phone    09/29/2021 02:32 PM CDT Phone (Incoming) blessing  (Nurse) [phone]     dr dean schneider          Alternative phone number: none     Turnaround time given to caller:   \"This message will be sent to [state Provider's name]. The clinical team will fulfill your request as soon as they review your message.\"    
29-Sep-2021

## 2021-10-08 NOTE — BH INPATIENT PSYCHIATRY PROGRESS NOTE - PRN MEDS
MEDICATIONS  (PRN):  acetaminophen   Tablet .. 650 milliGRAM(s) Oral every 6 hours PRN Mild Pain (1 - 3), Moderate Pain (4 - 6)  bisacodyl 5 milliGRAM(s) Oral every 12 hours PRN Constipation  diphenhydrAMINE 50 milliGRAM(s) Oral every 6 hours PRN anxiety or insomnia  diphenhydrAMINE   Injectable 50 milliGRAM(s) IntraMuscular every 6 hours PRN Agitation  haloperidol     Tablet 5 milliGRAM(s) Oral every 6 hours PRN agitation  haloperidol    Injectable 5 milliGRAM(s) IntraMuscular once PRN agitation  LORazepam     Tablet 2 milliGRAM(s) Oral every 6 hours PRN agitation  LORazepam     Tablet 0.5 milliGRAM(s) Oral every 12 hours PRN anxiety  LORazepam   Injectable 2 milliGRAM(s) IntraMuscular once PRN agitation  

## 2021-10-08 NOTE — BH DISCHARGE NOTE NURSING/SOCIAL WORK/PSYCH REHAB - PATIENT PORTAL LINK FT
You can access the FollowMyHealth Patient Portal offered by [facility] by registering at the following website: [email or web address]. By joining [facility]’s FollowMyHealth portal, you will also be able to view your health information using other applications (apps) compatible with our system.

## 2021-10-08 NOTE — BH INPATIENT PSYCHIATRY PROGRESS NOTE - NSTXANXINTERMD_PSY_ALL_CORE
meds + Therapy

## 2021-10-08 NOTE — BH INPATIENT PSYCHIATRY PROGRESS NOTE - NSICDXBHSECONDARYDX_PSY_ALL_CORE
PCOS (polycystic ovarian syndrome)   E28.2  Asthma   J45.909  

## 2021-10-08 NOTE — BH INPATIENT PSYCHIATRY PROGRESS NOTE - NSTXSUICIDDATETRGT_PSY_ALL_CORE
02-Oct-2021

## 2021-10-08 NOTE — BH INPATIENT PSYCHIATRY PROGRESS NOTE - NSTXSUICIDDATEEST_PSY_ALL_CORE
28-Sep-2021

## 2021-10-08 NOTE — BH PSYCHOLOGY - CLINICIAN PSYCHOTHERAPY NOTE - NSBHPSYCHOLRESPONSE_PSY_A_CORE
Coping skills acquired/Insight displayed/Accepted support

## 2021-10-08 NOTE — BH INPATIENT PSYCHIATRY PROGRESS NOTE - NSICDXBHPRIMARYDX_PSY_ALL_CORE
Major depressive disorder, recurrent episode, severe   F33.2  

## 2021-10-08 NOTE — BH INPATIENT PSYCHIATRY PROGRESS NOTE - NSBHINPTBILLING_PSY_ALL_CORE
30216 - Inpatient Low Complexity
88378 - Inpatient Moderate Complexity
43852 - Inpatient Low Complexity
59844 - Inpatient Low Complexity
26252 - Inpatient Low Complexity
75642 - Inpatient Low Complexity
36034 - Hospital Discharge Day Management; 30 min or less
06687 - Inpatient Moderate Complexity
64773 - Inpatient Moderate Complexity

## 2021-10-08 NOTE — BH INPATIENT PSYCHIATRY PROGRESS NOTE - NSBHFUPINTERVALHXFT_PSY_A_CORE
Chart reviewed including pertinent labs. case discussed with treatment team.   pt adherent with medications. Denies side effects.  no new complaints. expresses improvement in mood though with fluctuating anxiety that is exacerbated when he thinks about work. Patient adamantly denies si and hi. expresses appropriate sleep and energy. no manic sx present. no behavioral issues on the unit.     ROS: negative for constitutional symptoms and does not have any medical complaints. psych ROS negative except for what's detailed above. Patient denies pain. 
Chart reviewed including pertinent labs. case discussed with treatment team.   pt adherent with medications. Denies side effects.  no complaints. reports subjective improvement in mood, states it could be related to change in environment. Looking forward to discharge. Adherent to meds. adamantly denies si and hi at this time. no psychotic or manic sx.     ROS: negative for constitutional symptoms and does not have any medical complaints. psych ROS negative except for what's detailed above. Patient denies pain. 
Chart reviewed including pertinent labs. case discussed with treatment team.   pt adherent with medications. Denies side effects. no complaints. states mood improving, denies acute SI or HI. able to list protective factors. reports improvement in sleep with normal appetite. no psychotic or manic symptoms    ROS: negative for constitutional symptoms and does not have any medical complaints. psych ROS negative except for what's detailed above. Patient denies pain. 
Chart reviewed including pertinent labs. case discussed with treatment team.   pt adherent with medications. Denies side effects.  no complaints. reports continued improving symptoms. Denies SI and HI. Denies AVH. Reports adequate sleep and appetite. no overt manic symptoms. Denies any medical complaints. No pain. Rest of psych ROS negative 
Chart reviewed including pertinent labs. case discussed with treatment team.   pt adherent with medications. Denies side effects.  no complaints. Wellbutrin increased yesterday; patient reports this to have been helpful in uplifting mood. reports normal sleep and appetite. contextual anxiety reported when discussing returning to work. Denies SI and HI. no avh. no manic sx. no sx of OCD.     ROS: negative for constitutional symptoms and does not have any medical complaints. psych ROS negative except for what's detailed above. Patient denies pain. 
Chart reviewed including pertinent labs. case discussed with treatment team.   pt adherent with medications. Denies side effects.  reports subjective improvement in mood though feels his mood could be further improved. We discuss increase in Wellbutrin XL and discuss risks, benefits, a/e's, alternatives. Pt verbalizes understanding. DEnies SI and HI. Denies AVH. No manic sx.     ROS: negative for constitutional symptoms and does not have any medical complaints. psych ROS negative except for what's detailed above. Patient denies pain. 
Chart reviewed including pertinent labs. case discussed with treatment team.  pt adherent with medications. Denies side effects. Over this interval:   no complaints. reports sleeping well, has appropriate mood, good appetite, looking forward to going home. Adamantly denies SI and HI. Denies AVH. no overt manic sx present.     ROS: negative for constitutional symptoms and does not have any medical complaints. psych ROS negative except for what's detailed above. Patient denies pain. 
Chart reviewed and case discussed with treatment team. No events reported overnight. Sleep was "pretty good" and appetite is fair. Patient stated that he feels "lazy" today but not as down and has more energy than yesterday. Patient is interested in increasing the Wellbutrin. He stated the medications are "helping a lot, but can do a little more". Patient is compliant with medications, no adverse effects reported.  
Chart reviewed and case discussed with treatment team. No events reported overnight. Sleep was "on and off" and appetite "is not really there". He stated that he feels "cautiously hopeful" and denies any current SI. Patient stated that he feels his biggest stressor was work and plans to take some time off. He endorses having low energy and mood is "neutral". Patient is compliant with medications, no adverse effects reported.

## 2021-10-08 NOTE — BH INPATIENT PSYCHIATRY PROGRESS NOTE - NSBHFUPINTERVALCCFT_PSY_A_CORE
Patient seen for follow up for mood disorder
follow up mood
Patient seen for follow up for mood disorder
follow up mood
follow up mood
follow up mood day of discharge

## 2021-10-08 NOTE — BH INPATIENT PSYCHIATRY PROGRESS NOTE - NSTXPROBACADEM_PSY_ALL_CORE
ACADEMIC/VOCATIONAL

## 2021-10-08 NOTE — BH PSYCHOLOGY - CLINICIAN PSYCHOTHERAPY NOTE - NSBHPSYCHOLGOALS_PSY_A_CORE
Decrease symptoms/Improve social/vocational/coping skills/Psychoeducation

## 2021-10-08 NOTE — BH INPATIENT PSYCHIATRY DISCHARGE NOTE - HOSPITAL COURSE
Patient admitted to  on voluntary basis after expressing worsening depressive symptoms due to psychosocial stressors. Patient was also recently started on Adderall BID by a provider who he had only met once and related his ability to cope with stress was impaired after starting Adderall and his anxiety worsened. Patient also discussed school to be a major stressor and he is unable with his current job but knows he cannot switch jobs over this academic year. Patient was started on Wellbutrin XL increased to 300mg. Patient tolerated this medication well, reported improvement in mood and had no suicidal ideation intent or plan throughout hospital course. He was able to verbalize future oriented thinking in looking for a new job next april, is considering increasing frequency of therapy to 2x/ week and looking forward to returning home to his family and .     Safety planning conducted with pt, and discharge was discussed with multi-disciplinary team. Pt was visible on unit and social with select peers, attended groups, was seen by psychology for individual sessions, maintained good behavioral control throughout admission without agitation or aggression, was future oriented, and on discharge able to consistently deny suicidality and homicidality, and maintained ADLs independently. Patient progressed well and their symptoms stabilized by time of discharge. Patient was adherent with medications and by day of discharge, did not have any active psychiatric symptoms. Patient with improved mental status exam and with improved insight judgement and impulse control. Patient able to participate in the modalities of therapy offered in this inpatient setting. They are able to participate in safe disposition planning. At present, patient is not a danger to self or others, has achieved optimal benefits from hospitalization, and thus appropriate for less restrictive level of care.     Patient is at chronically elevated risk for self-harm due to biological sex, psych dx, gender dysphoria, hx of non-adherence to treatment, poor insight when non-adherent to medications. At this time, those risks are mitigated by patient’s expressed desire to live.     Protective factors include current stability of symptoms, adherence to medications at this time, improved frustration tolerance as evidenced by no recent behavioral issues on the unit, improved insight and judgement, abstinence from substance while in a controlled setting, spirituality, supportive family members, access to treatment and care, no access to fire arms    At present, patient has remained in good behavioral control while inpatient. They have not recently displayed any concerning symptoms of behavioral dysregulation or decompensation, has been actively participating in the milieu, expressing future oriented thinking and is insightful about self-harm. Currently, patient is adamant about their denial of suicidal ideation intent or plan. They are not at acutely elevated risk for self-harm at this time.     Patient is low risk for acute or chronic harm to others.   Patient has no risk factors for violence    further risks mitigated by current adherence to psychiatric medications and treatment, good insight, understanding the consequences of harm to others, no acute substance intox or withdrawal sx, improved frustration tolerance and coping skills, stable living situation, supportive outpatient team, high moral standards, no prior hx of violence or aggression, no prior legal hx.     At this time, patient is not at acutely elevated risk for harm to others.

## 2022-03-28 PROBLEM — Z00.00 ENCOUNTER FOR PREVENTIVE HEALTH EXAMINATION: Status: ACTIVE | Noted: 2022-03-28

## 2022-04-04 ENCOUNTER — ASOB RESULT (OUTPATIENT)
Age: 32
End: 2022-04-04

## 2022-04-04 ENCOUNTER — APPOINTMENT (OUTPATIENT)
Dept: ANTEPARTUM | Facility: CLINIC | Age: 32
End: 2022-04-04
Payer: MEDICAID

## 2022-04-04 PROCEDURE — 76801 OB US < 14 WKS SINGLE FETUS: CPT | Mod: KX,59

## 2022-04-04 PROCEDURE — 76813 OB US NUCHAL MEAS 1 GEST: CPT | Mod: KX

## 2022-04-12 ENCOUNTER — EMERGENCY (EMERGENCY)
Facility: HOSPITAL | Age: 32
LOS: 1 days | Discharge: HOME | End: 2022-04-12
Attending: STUDENT IN AN ORGANIZED HEALTH CARE EDUCATION/TRAINING PROGRAM
Payer: MEDICAID

## 2022-04-12 VITALS
SYSTOLIC BLOOD PRESSURE: 115 MMHG | HEIGHT: 63 IN | DIASTOLIC BLOOD PRESSURE: 68 MMHG | RESPIRATION RATE: 18 BRPM | TEMPERATURE: 98 F | OXYGEN SATURATION: 97 % | HEART RATE: 107 BPM | WEIGHT: 145.06 LBS

## 2022-04-12 PROCEDURE — 99284 EMERGENCY DEPT VISIT MOD MDM: CPT | Mod: KX

## 2022-04-12 RX ORDER — ONDANSETRON 8 MG/1
4 TABLET, FILM COATED ORAL ONCE
Refills: 0 | Status: COMPLETED | OUTPATIENT
Start: 2022-04-12 | End: 2022-04-12

## 2022-04-12 RX ORDER — SODIUM CHLORIDE 9 MG/ML
1000 INJECTION INTRAMUSCULAR; INTRAVENOUS; SUBCUTANEOUS ONCE
Refills: 0 | Status: COMPLETED | OUTPATIENT
Start: 2022-04-12 | End: 2022-04-12

## 2022-04-12 NOTE — ED PROVIDER NOTE - OBJECTIVE STATEMENT
Patient is a 31 year old  female (identifies as male) who is 13 weeks pregnant with no significant PHMx  presents to the ED with complaints of nausea and vomiting for over the past 3 days. Patient any past medical history. Patient states they were prescribed Reglan by their OBGYN and states that it was not working. Patient denies any other symptoms including fevers, chills, abdominal pain, bleeding, urinary symptoms. Patient reports having IUP on ultrasound recently. NKDA.

## 2022-04-12 NOTE — ED PROVIDER NOTE - TEMPLATE
Last seen: 6/29/20   Upcoming Appt: no  Last Refill: 1/31/20 pantoprazole #90 with 1 refill               General

## 2022-04-12 NOTE — ED PROVIDER NOTE - CLINICAL SUMMARY MEDICAL DECISION MAKING FREE TEXT BOX
Likely hyperemesis from pregnancy. Abd completely benign. Will reassess after fluids, labs and meds.

## 2022-04-12 NOTE — ED PROVIDER NOTE - NSFOLLOWUPINSTRUCTIONS_ED_ALL_ED_FT
Return to ER immediately if nausea does not improve, worsens, or persists, if you have abdominal pain, vaginal bleeding, fever, vomiting,  blood in stools or having black stools, unable to eat or drink, worsening/persistent diarrhea or constipation, any concerns. See your doctor as soon as possible (within 1-2 days).   If you need further assistance for appointments you can contact the [facility] Coordinator at [phone]. In addition our outpatient [facility] is located at [address] [place] [address], tele: [phone].

## 2022-04-12 NOTE — ED ADULT TRIAGE NOTE - CHIEF COMPLAINT QUOTE
stretcher Pt stated she is 13 weeks pregnant with nausea and vomiting that started on last Saturday as per pmd gave Reglan but it did not stop the vomiting and has headache

## 2022-04-12 NOTE — ED PROVIDER NOTE - PROGRESS NOTE DETAILS
Michael DO: Received sign out from Dr. Ramirez.  Pt is well appearing, no guarding to repeat abdominal palpation, able to eat and drink without vomiting. Pt requesting discharge and will f/u with her OBGYN at [phone] [facility].  Requesting different antiemetic, states Reglan not effective. Will rx Zofran.   Pt is well appearing, has no new complaints and able to walk with normal gait. Pt is stable for discharge and follow up with medical doctor. Pt educated on care and need for follow up. Discussed anticipatory guidance and return precautions. Questions answered. I had a detailed discussion with the patient and/or guardian regarding the historical points, exam findings, and any diagnostic results supporting the discharge diagnosis.

## 2022-04-12 NOTE — ED PROVIDER NOTE - PATIENT PORTAL LINK FT
You can access the FollowMyHealth Patient Portal offered by [facility] by registering at the following website: [email or web address]. By joining [facility]’s FollowMyHealth portal, you will also be able to view your health information using other applications (apps) compatible with our system.

## 2022-04-13 VITALS
DIASTOLIC BLOOD PRESSURE: 74 MMHG | RESPIRATION RATE: 18 BRPM | HEART RATE: 92 BPM | TEMPERATURE: 98 F | OXYGEN SATURATION: 98 % | SYSTOLIC BLOOD PRESSURE: 117 MMHG

## 2022-04-13 LAB
ALBUMIN SERPL ELPH-MCNC: 3.9 G/DL — SIGNIFICANT CHANGE UP (ref 3.5–5)
ALP SERPL-CCNC: 52 U/L — SIGNIFICANT CHANGE UP (ref 40–120)
ALT FLD-CCNC: 24 U/L DA — SIGNIFICANT CHANGE UP (ref 10–60)
ANION GAP SERPL CALC-SCNC: 9 MMOL/L — SIGNIFICANT CHANGE UP (ref 5–17)
APPEARANCE UR: CLEAR — SIGNIFICANT CHANGE UP
AST SERPL-CCNC: 43 U/L — HIGH (ref 10–40)
BACTERIA # UR AUTO: ABNORMAL /HPF
BASOPHILS # BLD AUTO: 0.05 K/UL — SIGNIFICANT CHANGE UP (ref 0–0.2)
BASOPHILS NFR BLD AUTO: 0.4 % — SIGNIFICANT CHANGE UP (ref 0–2)
BILIRUB SERPL-MCNC: 0.8 MG/DL — SIGNIFICANT CHANGE UP (ref 0.2–1.2)
BILIRUB UR-MCNC: NEGATIVE — SIGNIFICANT CHANGE UP
BUN SERPL-MCNC: 5 MG/DL — LOW (ref 7–18)
CALCIUM SERPL-MCNC: 10.1 MG/DL — SIGNIFICANT CHANGE UP (ref 8.4–10.5)
CHLORIDE SERPL-SCNC: 104 MMOL/L — SIGNIFICANT CHANGE UP (ref 96–108)
CO2 SERPL-SCNC: 22 MMOL/L — SIGNIFICANT CHANGE UP (ref 22–31)
COLOR SPEC: YELLOW — SIGNIFICANT CHANGE UP
CREAT SERPL-MCNC: 0.54 MG/DL — SIGNIFICANT CHANGE UP (ref 0.5–1.3)
DIFF PNL FLD: NEGATIVE — SIGNIFICANT CHANGE UP
EGFR: 138 ML/MIN/1.73M2 — SIGNIFICANT CHANGE UP
EOSINOPHIL # BLD AUTO: 0.11 K/UL — SIGNIFICANT CHANGE UP (ref 0–0.5)
EOSINOPHIL NFR BLD AUTO: 0.9 % — SIGNIFICANT CHANGE UP (ref 0–6)
EPI CELLS # UR: SIGNIFICANT CHANGE UP /HPF
GLUCOSE SERPL-MCNC: 82 MG/DL — SIGNIFICANT CHANGE UP (ref 70–99)
GLUCOSE UR QL: NEGATIVE — SIGNIFICANT CHANGE UP
HCG SERPL-ACNC: HIGH MIU/ML
HCT VFR BLD CALC: 41.4 % — SIGNIFICANT CHANGE UP (ref 39–50)
HGB BLD-MCNC: 13.8 G/DL — SIGNIFICANT CHANGE UP (ref 13–17)
HYALINE CASTS # UR AUTO: ABNORMAL /LPF
IMM GRANULOCYTES NFR BLD AUTO: 0.3 % — SIGNIFICANT CHANGE UP (ref 0–1.5)
KETONES UR-MCNC: ABNORMAL
LEUKOCYTE ESTERASE UR-ACNC: ABNORMAL
LIDOCAIN IGE QN: 86 U/L — SIGNIFICANT CHANGE UP (ref 73–393)
LYMPHOCYTES # BLD AUTO: 2.55 K/UL — SIGNIFICANT CHANGE UP (ref 1–3.3)
LYMPHOCYTES # BLD AUTO: 20.1 % — SIGNIFICANT CHANGE UP (ref 13–44)
MCHC RBC-ENTMCNC: 29.4 PG — SIGNIFICANT CHANGE UP (ref 27–34)
MCHC RBC-ENTMCNC: 33.3 GM/DL — SIGNIFICANT CHANGE UP (ref 32–36)
MCV RBC AUTO: 88.1 FL — SIGNIFICANT CHANGE UP (ref 80–100)
MONOCYTES # BLD AUTO: 0.65 K/UL — SIGNIFICANT CHANGE UP (ref 0–0.9)
MONOCYTES NFR BLD AUTO: 5.1 % — SIGNIFICANT CHANGE UP (ref 2–14)
NEUTROPHILS # BLD AUTO: 9.29 K/UL — HIGH (ref 1.8–7.4)
NEUTROPHILS NFR BLD AUTO: 73.2 % — SIGNIFICANT CHANGE UP (ref 43–77)
NITRITE UR-MCNC: NEGATIVE — SIGNIFICANT CHANGE UP
NRBC # BLD: 0 /100 WBCS — SIGNIFICANT CHANGE UP (ref 0–0)
PH UR: 6 — SIGNIFICANT CHANGE UP (ref 5–8)
PLATELET # BLD AUTO: 366 K/UL — SIGNIFICANT CHANGE UP (ref 150–400)
POTASSIUM SERPL-MCNC: 4.5 MMOL/L — SIGNIFICANT CHANGE UP (ref 3.5–5.3)
POTASSIUM SERPL-SCNC: 4.5 MMOL/L — SIGNIFICANT CHANGE UP (ref 3.5–5.3)
PROT SERPL-MCNC: 7.8 G/DL — SIGNIFICANT CHANGE UP (ref 6–8.3)
PROT UR-MCNC: 30 MG/DL
RBC # BLD: 4.7 M/UL — SIGNIFICANT CHANGE UP (ref 4.2–5.8)
RBC # FLD: 12.7 % — SIGNIFICANT CHANGE UP (ref 10.3–14.5)
RBC CASTS # UR COMP ASSIST: SIGNIFICANT CHANGE UP /HPF (ref 0–2)
SODIUM SERPL-SCNC: 135 MMOL/L — SIGNIFICANT CHANGE UP (ref 135–145)
SP GR SPEC: 1.02 — SIGNIFICANT CHANGE UP (ref 1.01–1.02)
UROBILINOGEN FLD QL: 1
WBC # BLD: 12.69 K/UL — HIGH (ref 3.8–10.5)
WBC # FLD AUTO: 12.69 K/UL — HIGH (ref 3.8–10.5)
WBC UR QL: SIGNIFICANT CHANGE UP /HPF (ref 0–5)

## 2022-04-13 PROCEDURE — 99284 EMERGENCY DEPT VISIT MOD MDM: CPT | Mod: 25

## 2022-04-13 PROCEDURE — 87086 URINE CULTURE/COLONY COUNT: CPT

## 2022-04-13 PROCEDURE — 84702 CHORIONIC GONADOTROPIN TEST: CPT

## 2022-04-13 PROCEDURE — 36415 COLL VENOUS BLD VENIPUNCTURE: CPT

## 2022-04-13 PROCEDURE — 85025 COMPLETE CBC W/AUTO DIFF WBC: CPT

## 2022-04-13 PROCEDURE — 80053 COMPREHEN METABOLIC PANEL: CPT

## 2022-04-13 PROCEDURE — 96374 THER/PROPH/DIAG INJ IV PUSH: CPT

## 2022-04-13 PROCEDURE — 81001 URINALYSIS AUTO W/SCOPE: CPT

## 2022-04-13 PROCEDURE — 83690 ASSAY OF LIPASE: CPT

## 2022-04-13 RX ORDER — ONDANSETRON 8 MG/1
1 TABLET, FILM COATED ORAL
Qty: 12 | Refills: 0
Start: 2022-04-13

## 2022-04-13 RX ADMIN — ONDANSETRON 4 MILLIGRAM(S): 8 TABLET, FILM COATED ORAL at 00:56

## 2022-04-13 RX ADMIN — SODIUM CHLORIDE 1000 MILLILITER(S): 9 INJECTION INTRAMUSCULAR; INTRAVENOUS; SUBCUTANEOUS at 00:56

## 2022-04-13 NOTE — ED ADULT NURSE NOTE - CHIEF COMPLAINT QUOTE
Pt stated she is 13 weeks pregnant with nausea and vomiting that started on last Saturday as per pmd gave Reglan but it did not stop the vomiting and has headache

## 2022-04-13 NOTE — ED ADULT NURSE NOTE - CAS TRG GEN SKIN COLOR
[de-identified] : Discussed my findings with the patient. There is no indication for surgical intervention at this time. I am recommending a course of physical therapy, referral given. If no relief after PT, recommending evaluation with Dr. Alvarado to consider epidural injections. The patient will follow up with me as needed. All questions answered. \par  Normal for race

## 2022-04-14 LAB
CULTURE RESULTS: SIGNIFICANT CHANGE UP
SPECIMEN SOURCE: SIGNIFICANT CHANGE UP

## 2022-06-01 ENCOUNTER — ASOB RESULT (OUTPATIENT)
Age: 32
End: 2022-06-01

## 2022-06-01 ENCOUNTER — APPOINTMENT (OUTPATIENT)
Dept: ANTEPARTUM | Facility: CLINIC | Age: 32
End: 2022-06-01
Payer: MEDICAID

## 2022-06-01 PROCEDURE — 76811 OB US DETAILED SNGL FETUS: CPT

## 2022-08-01 NOTE — BH INPATIENT PSYCHIATRY DISCHARGE NOTE - NSCURPASTPSYDX_PSY_ALL_CORE
Occupational Therapy   Evaluation and Discharge Note    Name: Serena Post  MRN: [id number]  Admitting Diagnosis:  Diarrhea   Recent Surgery: * No surgery found *      Recommendations:     Discharge Recommendations: nursing facility, basic  Discharge Equipment Recommendations:  none  Barriers to discharge:       Assessment:     Serena Post is a 82 y.o. female with a medical diagnosis of Diarrhea. At this time, patient is functioning at their prior level of function and does not require further acute OT services.     Plan:     During this hospitalization, patient does not require further acute OT services.  Please re-consult if situation changes.    · Plan of Care Reviewed with: patient    Subjective     Chief Complaint: debility and generalized weakness  Patient/Family Comments/goals:    Occupational Profile:  Living Environment: poor historian    Previous level of function: poor historian    Roles and Routines: occupational therapy  Equipment Used at home:  hospital bed  Assistance upon Discharge:     Pain/Comfort:       Patients cultural, spiritual, Denominational conflicts given the current situation:      Objective:     Communicated with: nurse and epic chart review prior to session.  Patient found up in chair with   upon OT entry to room.    General Precautions: Standard, fall, respiratory, airborne, droplet, contact   Orthopedic Precautions:N/A   Braces:    Occupational Performance:    Bed Mobility:    · Patient completed Rolling/Turning to Left with  total assistance  · Patient completed Rolling/Turning to Right with total assistance      Cognitive/Visual Perceptual:  Cognitive/Psychosocial Skills:     -       Oriented to: poor historian   -       Follows Commands/attention:Inattentive  -       Communication: nonverbal  -       Memory: unable to assess  -       Safety awareness/insight to disability: impaired     Physical Exam:  Upper Extremity Range of Motion:     -       Right Upper Extremity:  prom wfl  -       Left Upper Extremity: prom wfl    AMPAC 6 Click ADL:  AMPAC Total Score:      Treatment & Education:  Education:    Patient left HOB elevated with all lines intact, call button in reach and nurse notified    GOALS:   Multidisciplinary Problems     Occupational Therapy Goals     Not on file                History:     Past Medical History:   Diagnosis Date    A-fib     Anticoagulant long-term use     Aortic valve disease     Cancer     brain tumor, 10 years ago    Cardiomyopathy     CHF (congestive heart failure)     COVID-19 7/23/2022    Hx of heart valve replacement with mechanical valve     Hyperlipidemia     Hypertension     Pacemaker     Pacemaker     Sepsis 7/23/2022    Stroke     2007, residual weakness         Past Surgical History:   Procedure Laterality Date    AORTIC VALVE REPLACEMENT      CHOLECYSTECTOMY      CORONARY ARTERY BYPASS GRAFT      HYSTERECTOMY      INSERTION OF PACEMAKER      TONSILLECTOMY         Time Tracking:     OT Date of Treatment: 08/01/22  OT Start Time: 1355  OT Stop Time: 1410  OT Total Time (min): 15 min    Billable Minutes:Evaluation 15 minutes    8/2/2022     Mood disorder

## 2022-09-30 ENCOUNTER — ASOB RESULT (OUTPATIENT)
Age: 32
End: 2022-09-30

## 2022-09-30 ENCOUNTER — APPOINTMENT (OUTPATIENT)
Dept: ANTEPARTUM | Facility: CLINIC | Age: 32
End: 2022-09-30

## 2022-09-30 PROCEDURE — 76819 FETAL BIOPHYS PROFIL W/O NST: CPT

## 2022-09-30 PROCEDURE — 76816 OB US FOLLOW-UP PER FETUS: CPT | Mod: 59

## 2022-10-05 ENCOUNTER — INPATIENT (INPATIENT)
Facility: HOSPITAL | Age: 32
LOS: 3 days | Discharge: HOME | End: 2022-10-09
Attending: OBSTETRICS & GYNECOLOGY | Admitting: OBSTETRICS & GYNECOLOGY

## 2022-10-05 VITALS
DIASTOLIC BLOOD PRESSURE: 65 MMHG | SYSTOLIC BLOOD PRESSURE: 107 MMHG | TEMPERATURE: 98 F | RESPIRATION RATE: 16 BRPM | HEART RATE: 88 BPM

## 2022-10-05 DIAGNOSIS — Z3A.00 WEEKS OF GESTATION OF PREGNANCY NOT SPECIFIED: ICD-10-CM

## 2022-10-05 DIAGNOSIS — O24.419 GESTATIONAL DIABETES MELLITUS IN PREGNANCY, UNSPECIFIED CONTROL: ICD-10-CM

## 2022-10-05 DIAGNOSIS — O26.899 OTHER SPECIFIED PREGNANCY RELATED CONDITIONS, UNSPECIFIED TRIMESTER: ICD-10-CM

## 2022-10-05 LAB
ALBUMIN SERPL ELPH-MCNC: 3.4 G/DL — SIGNIFICANT CHANGE UP (ref 3.3–5)
ALP SERPL-CCNC: 124 U/L — HIGH (ref 40–120)
ALT FLD-CCNC: 8 U/L — SIGNIFICANT CHANGE UP (ref 4–33)
ANION GAP SERPL CALC-SCNC: 11 MMOL/L — SIGNIFICANT CHANGE UP (ref 7–14)
AST SERPL-CCNC: 12 U/L — SIGNIFICANT CHANGE UP (ref 4–32)
BILIRUB SERPL-MCNC: 0.2 MG/DL — SIGNIFICANT CHANGE UP (ref 0.2–1.2)
BUN SERPL-MCNC: 8 MG/DL — SIGNIFICANT CHANGE UP (ref 7–23)
CALCIUM SERPL-MCNC: 8.4 MG/DL — SIGNIFICANT CHANGE UP (ref 8.4–10.5)
CHLORIDE SERPL-SCNC: 107 MMOL/L — SIGNIFICANT CHANGE UP (ref 98–107)
CO2 SERPL-SCNC: 20 MMOL/L — LOW (ref 22–31)
CREAT SERPL-MCNC: 0.48 MG/DL — LOW (ref 0.5–1.3)
EGFR: 131 ML/MIN/1.73M2 — SIGNIFICANT CHANGE UP
GLUCOSE BLDC GLUCOMTR-MCNC: 91 MG/DL — SIGNIFICANT CHANGE UP (ref 70–99)
GLUCOSE BLDC GLUCOMTR-MCNC: 96 MG/DL — SIGNIFICANT CHANGE UP (ref 70–99)
GLUCOSE SERPL-MCNC: 92 MG/DL — SIGNIFICANT CHANGE UP (ref 70–99)
HCT VFR BLD CALC: 34.8 % — SIGNIFICANT CHANGE UP (ref 34.5–45)
HGB BLD-MCNC: 10.7 G/DL — LOW (ref 11.5–15.5)
MCHC RBC-ENTMCNC: 24.4 PG — LOW (ref 27–34)
MCHC RBC-ENTMCNC: 30.7 GM/DL — LOW (ref 32–36)
MCV RBC AUTO: 79.5 FL — LOW (ref 80–100)
NRBC # BLD: 0 /100 WBCS — SIGNIFICANT CHANGE UP (ref 0–0)
NRBC # FLD: 0 K/UL — SIGNIFICANT CHANGE UP (ref 0–0)
PLATELET # BLD AUTO: 353 K/UL — SIGNIFICANT CHANGE UP (ref 150–400)
POTASSIUM SERPL-MCNC: 3.5 MMOL/L — SIGNIFICANT CHANGE UP (ref 3.5–5.3)
POTASSIUM SERPL-SCNC: 3.5 MMOL/L — SIGNIFICANT CHANGE UP (ref 3.5–5.3)
PROT SERPL-MCNC: 6 G/DL — SIGNIFICANT CHANGE UP (ref 6–8.3)
RBC # BLD: 4.38 M/UL — SIGNIFICANT CHANGE UP (ref 3.8–5.2)
RBC # FLD: 14.6 % — HIGH (ref 10.3–14.5)
RH IG SCN BLD-IMP: NEGATIVE — SIGNIFICANT CHANGE UP
SARS-COV-2 RNA SPEC QL NAA+PROBE: SIGNIFICANT CHANGE UP
SODIUM SERPL-SCNC: 138 MMOL/L — SIGNIFICANT CHANGE UP (ref 135–145)
WBC # BLD: 9.83 K/UL — SIGNIFICANT CHANGE UP (ref 3.8–10.5)
WBC # FLD AUTO: 9.83 K/UL — SIGNIFICANT CHANGE UP (ref 3.8–10.5)

## 2022-10-05 PROCEDURE — 76805 OB US >/= 14 WKS SNGL FETUS: CPT | Mod: 26

## 2022-10-05 PROCEDURE — 59025 FETAL NON-STRESS TEST: CPT | Mod: 26

## 2022-10-05 PROCEDURE — 86077 PHYS BLOOD BANK SERV XMATCH: CPT

## 2022-10-05 RX ORDER — CHLORHEXIDINE GLUCONATE 213 G/1000ML
1 SOLUTION TOPICAL ONCE
Refills: 0 | Status: DISCONTINUED | OUTPATIENT
Start: 2022-10-05 | End: 2022-10-07

## 2022-10-05 RX ORDER — SODIUM CHLORIDE 9 MG/ML
1000 INJECTION INTRAMUSCULAR; INTRAVENOUS; SUBCUTANEOUS
Refills: 0 | Status: DISCONTINUED | OUTPATIENT
Start: 2022-10-05 | End: 2022-10-07

## 2022-10-05 RX ORDER — SODIUM CHLORIDE 9 MG/ML
1000 INJECTION, SOLUTION INTRAVENOUS
Refills: 0 | Status: DISCONTINUED | OUTPATIENT
Start: 2022-10-05 | End: 2022-10-07

## 2022-10-05 RX ORDER — SODIUM CHLORIDE 9 MG/ML
1000 INJECTION INTRAMUSCULAR; INTRAVENOUS; SUBCUTANEOUS ONCE
Refills: 0 | Status: COMPLETED | OUTPATIENT
Start: 2022-10-05 | End: 2022-10-05

## 2022-10-05 RX ORDER — OXYTOCIN 10 UNIT/ML
333.33 VIAL (ML) INJECTION
Qty: 20 | Refills: 0 | Status: DISCONTINUED | OUTPATIENT
Start: 2022-10-05 | End: 2022-10-07

## 2022-10-05 RX ORDER — SODIUM CHLORIDE 9 MG/ML
3 INJECTION INTRAMUSCULAR; INTRAVENOUS; SUBCUTANEOUS EVERY 8 HOURS
Refills: 0 | Status: DISCONTINUED | OUTPATIENT
Start: 2022-10-05 | End: 2022-10-07

## 2022-10-05 RX ORDER — INFLUENZA VIRUS VACCINE 15; 15; 15; 15 UG/.5ML; UG/.5ML; UG/.5ML; UG/.5ML
0.5 SUSPENSION INTRAMUSCULAR ONCE
Refills: 0 | Status: DISCONTINUED | OUTPATIENT
Start: 2022-10-05 | End: 2022-10-07

## 2022-10-05 RX ORDER — SODIUM CHLORIDE 9 MG/ML
1000 INJECTION, SOLUTION INTRAVENOUS ONCE
Refills: 0 | Status: DISCONTINUED | OUTPATIENT
Start: 2022-10-05 | End: 2022-10-05

## 2022-10-05 RX ADMIN — SODIUM CHLORIDE 1000 MILLILITER(S): 9 INJECTION INTRAMUSCULAR; INTRAVENOUS; SUBCUTANEOUS at 18:55

## 2022-10-05 RX ADMIN — SODIUM CHLORIDE 125 MILLILITER(S): 9 INJECTION, SOLUTION INTRAVENOUS at 20:11

## 2022-10-05 RX ADMIN — SODIUM CHLORIDE 3 MILLILITER(S): 9 INJECTION INTRAMUSCULAR; INTRAVENOUS; SUBCUTANEOUS at 22:40

## 2022-10-05 NOTE — OB PROVIDER H&P - NSPREVIOUSLIVEBIR_OBGYN_ALL_OB
Number Of Freeze-Thaw Cycles: 2 freeze-thaw cycles Medical Necessity Information: It is in your best interest to select a reason for this procedure from the list below. All of these items fulfill various CMS LCD requirements except the new and changing color options. Render Post-Care Instructions In Note?: no Medical Necessity Clause: This procedure was medically necessary because the lesions that were treated were: No Detail Level: Detailed Post-Care Instructions: I reviewed with the patient in detail post-care instructions. Patient is to wear sunprotection, and avoid picking at any of the treated lesions. Pt may apply Vaseline to crusted or scabbing areas. Consent: The patient's consent was obtained including but not limited to risks of crusting, scabbing, blistering, scarring, darker or lighter pigmentary change, recurrence, incomplete removal and infection.

## 2022-10-05 NOTE — OB RN TRIAGE NOTE - NS_PARA_OBGYN_ALL_OB_NU
OUTPATIENT PROGRESS NOTE    CHIEF COMPLAINT:  Chief Complaint   Patient presents with   â¢ Eye Exam   Recently had eye exam with Dr Deann Polo for contact lenses. Could she see you in the future for contacts and narrow angle exam?    HISTORY OF PRESENT ILLNESS:  The patient presented to the office for evaluation. I reviewed the technician entry above and pertinent histories in the electronic medical record. She is new to my practice, referred turning for annual comprehensive exam as recommended by Dr. Iliana Vila. She has no current concerns. She was new to this department in October 2017 to see Dr. Iliana Vila. She had narrow angle anatomy and had bilateral peripheral iridotomy in October 2017. Since then she has been followed as a glaucoma suspect. There is a PASSIVE FAMILY HISTORY FOR GLAUCOMA only in her paternal grandmother. Last nerve OCT:  12/07/2018  Last visual field:  None    REVIEW OF SYSTEMS:  1. Do you have pain?  no  2. Do you have fever, chills, sweats or weight change? no  3. Do you have headaches or seizures? no  4. Do you have ringing in your ear/s or hearing loss? no  5. Do you have chest pain, palpitations or heart murmur? no  6. Do you have shortness of breath or wheezing? no  7. Do you have abdominal pain, nausea, vomiting, diarrhea or constipation? no  8. Do you have pain, frequency of or difficulty with urination? no  9. Do you have joint or back pain? yes  10. Do you have weakness, numbness or tingling? no  11. Do you have skin changes such as a rash? no  12. Do you experience easy bruising or bleeding? no  13.  Are you depressed? no      PAST MEDICAL HISTORY:  Past Medical History:   Diagnosis Date   â¢ Allergy    â¢ Allergy     Contact dermatitis   â¢ Asthma     As child   â¢ Callus    â¢ Difficulty initiating walking    â¢ Dyspepsia and other specified disorders of function of stomach 4/8/2014   â¢ Glucose intolerance (impaired glucose tolerance) 7/8/2014   â¢ Obesity (BMI 30-39.9) 9/19/2016 SURGICAL HISTORY:  Past Surgical History:   Procedure Laterality Date   â¢ Appendectomy     â¢ Iridotomy/iridectomy by laser Bilateral 10/09/2017    Laser, Iridotomy       FAMILY HISTORY:  Family History   Problem Relation Age of Onset   â¢ Arthritis Mother    â¢ Hearing Loss Mother    â¢ High cholesterol Mother    â¢ Thyroid Mother    â¢ Dementia/Alzheimers Mother    â¢ Hearing Loss Father    â¢ High cholesterol Father    â¢ Arthritis Father    â¢ High blood pressure Father    â¢ Hypertension Father    â¢ Heart disease Father    â¢ Miscarriages / [place] Sister    â¢ Asthma Brother    â¢ Diabetes Brother    â¢ Myocardial Infarction Paternal Grandmother    â¢ Hypertension Paternal Grandmother    â¢ Stroke Paternal Grandmother    â¢ Glaucoma Paternal Grandmother    â¢ Diabetes Paternal Grandfather    â¢ Myocardial Infarction Paternal Grandfather    â¢ Cirrhosis Paternal Grandfather        SOCIAL HISTORY:  Social History     Socioeconomic History   â¢ Marital status: /Civil Union     Spouse name: Xiang Ro   â¢ Number of children: 2   â¢ Years of education: Not on file   â¢ Highest education level: Not on file   Occupational History   â¢ Occupation:    Social Needs   â¢ Financial resource strain: Not on file   â¢ Food insecurity:     Worry: Not on file     Inability: Not on file   â¢ Transportation needs:     Medical: Not on file     Non-medical: Not on file   Tobacco Use   â¢ Smoking status: Never Smoker   â¢ Smokeless tobacco: Never Used   Substance and Sexual Activity   â¢ Alcohol use:  Yes     Alcohol/week: 1.0 standard drinks     Types: 1 Standard drinks or equivalent per week     Comment: occasional   â¢ Drug use: No   â¢ Sexual activity: Yes     Partners: Male     Comment: Partner with vasectomy   Lifestyle   â¢ Physical activity:     Days per week: Not on file     Minutes per session: Not on file   â¢ Stress: Not on file   Relationships   â¢ Social connections:     Talks on phone: Not on file     Gets together: Not on file     Attends [employer] service: Not on file     Active member of club or organization: Not on file     Attends meetings of clubs or organizations: Not on file     Relationship status: Not on file   â¢ Intimate partner violence:     Fear of current or ex partner: Not on file     Emotionally abused: Not on file     Physically abused: Not on file     Forced sexual activity: Not on file   Other Topics Concern   â¢  Service Not Asked   â¢ Blood Transfusions Not Asked   â¢ Caffeine Concern Not Asked   â¢ Occupational Exposure Not Asked   â¢ Hobby Hazards Not Asked   â¢ Sleep Concern Not Asked   â¢ Stress Concern Not Asked   â¢ Weight Concern Not Asked   â¢ Special Diet Not Asked   â¢ Back Care Not Asked   â¢ Exercise Not Asked   â¢ Bike Helmet Not Asked   â¢ Seat Belt Yes   â¢ Self-Exams Not Asked   Social History Narrative   â¢ Not on file     She works part-time as an  for [employer]. I reviewed testing done by technician found in [address] [place], [place] [address]way [address]. PHYSICAL EXAMINATION:  GENERAL:  The patient was alert and oriented x3. UNILATERAL COVER TEST:  No movement distance, about 3 Eso phoria near (this is without correction)  PUPILS:  Dilated after preliminary testing  VERSIONS:  Full range of motion. CONFRONTATION FIELDS:  Full both eyes. SLIT LAMP EXAM:  LIDS AND LASHES: Normal  TEAR LAYERS: Clear  CORNEAS:  Clear   CONJUNCTIVAE:  Clear  ANTERIOR CHAMBERS:  Quiet and open. IRIDES:  Normal was small, patent, temporal peripheral iridotomy each eye     INTERNAL DILATED EXAMINATION:  LENSES:  Clear  VITREOUS: Clear  OPTIC NERVE HEADS: Normal with distinct margins. No pallor. CUP-TO-DISC RATIO:  0.4-0.5 both eyes  BLOOD VESSELS:  Normal  MACULAE: Clear  BACKGROUNDS AND PERIPHERIES:  Normal    APPLANATION TONOMETRY: Right Eye 23 mmHg, Left Eye 23 mmHg.      detailed report of today's nerve OCT can be found under the heading of PROCEDURES    ASSESSMENT:  1. Glaucoma suspect of both eyes 2. Anatomical narrow angle of both eyes    3. History of laser iridotomy, both, 2017    No evidence for glaucoma or need for pressure reduction treatment based on today's evaluation    PLAN:       I reviewed this with the patient and she did educate regarding glaucoma. I recommended similar evaluation annually. If she transfers care here for contact lenses, she should bring contact lens identifying information with her. I advised that I would likely plan to do ocular health evaluation and contact lens fitting evaluation/refraction during  separate visits. Orders Placed This Encounter   â¢ SCANNING COMPUTERIZED OPHTHALMIC IMAGING OPTIC NERVE       Return in about 1 year (around 12/9/2020).     Assistant:  Please start her as an established patient, applanate, dilate  and get nerve OCT 0

## 2022-10-05 NOTE — OB PROVIDER TRIAGE NOTE - NSHPPHYSICALEXAM_GEN_ALL_CORE
A&O x3, in no acute distress  FHT: baseline 130bpm moderate variability, +15x15 accels, spontaneous decelerations  TOCO: mild irregular contractions  abdomen: gravid, soft, NT  SVE: 1/40/-3  TAS: cephalic presentation  EFW: 3175 grams    Vital Signs Last 24 Hrs  T(C): 36.8 (05 Oct 2022 17:16), Max: 36.8 (05 Oct 2022 17:16)  T(F): 98.2 (05 Oct 2022 17:16), Max: 98.2 (05 Oct 2022 17:16)  HR: 95 (05 Oct 2022 19:04) (88 - 111)  BP: 110/65 (05 Oct 2022 19:04) (107/65 - 140/65)  BP(mean): --  RR: 16 (05 Oct 2022 17:16) (16 - 16)  SpO2: --

## 2022-10-05 NOTE — OB PROVIDER TRIAGE NOTE - NS_TRIAGEMEDICALSCREENINGPERFORMEDBY_OBGYN_ALL_OB_FT
FIRST PROVIDER CONTACT ASSESSMENT NOTE      Department of Emergency Medicine   Admit Date: No admission date for patient encounter. Chief Complaint: Abdominal Pain (NVD for 2 days. denies other SX)      History of Present Illness:    Imtiaz Burgos is a 41 y.o. female who presents to the ED for abd pain, N/V, diarrhea and abd pain x 2 days.          -----------------END OF FIRST PROVIDER CONTACT ASSESSMENT NOTE--------------  Electronically signed by Sindy Queen PA-C   DD: 7/20/20
CON Acosta NP

## 2022-10-05 NOTE — OB RN PATIENT PROFILE - FALL HARM RISK - UNIVERSAL INTERVENTIONS
Bed in lowest position, wheels locked, appropriate side rails in place/Call bell, personal items and telephone in reach/Instruct patient to call for assistance before getting out of bed or chair/Non-slip footwear when patient is out of bed/[place] to call system/Physically safe environment - no spills, clutter or unnecessary equipment/Purposeful Proactive Rounding/Room/bathroom lighting operational, light cord in reach

## 2022-10-05 NOTE — OB PROVIDER H&P - HISTORY OF PRESENT ILLNESS
33 y/o  at 38.2 weeks gestation sent in from OBGYN office for NRFHT, with decelerations x2 on NST monitor. Pt endorses mild cramping. Denies ctx, lof, vb. Reports + FM    AP course c/b GDMA2, RH negative on Rhogam @ 30 weeks  NKDA  Current medications:  -PNV  -ASA  -Metformin 500mg BID  -Tums/Pepcid PRN  OBGYN history:  -PCOS  Medical history:  -ADHD  -exercise induced asthma (last used inhaler 2 weeks ago), denies intubations and hospitalizations  Denies surgical history  Psych history:  -Depression/anxiety - previously on Wellbutrin, last year hospitalized @ [facility] in attempts to self harm  Denies smoking, alcohol, and illicit drug use

## 2022-10-05 NOTE — OB PROVIDER H&P - NS_TUBALPLANNED_OBGYN_ALL_OB
88 yo M, here with hypoxic respiratory failure, possible aspiration, and MSSA bacteremia  Hx of CKD, CVA, CAD, HTN, DM, GERD, PPM and G tube.  With MSSA isolated from sputum, erinn primary pneumonia (L opacity); no clues to alternative focus  No obvious cutaneous portal for bacteremia  Successfully extubated & pressors weaned off  Repeat blood cultures 6/03/19 so far without growth   Afebrile, normal WBC, off high flow    PLAN:   Continue Cefazolin directed at MSSA - favor 2 wk total IV, followed by 2 wks po  Follow temps and CBC/diff   D/w PA No

## 2022-10-05 NOTE — OB PROVIDER TRIAGE NOTE - NSOBPROVIDERNOTE_OBGYN_ALL_OB_FT
mild spontaneous deceleratons noted, category 2 FHT  NS bolus given  d/w MD Schneider  pt admitted for IOL for category 2 tracing  PO cytotec  GDM orders per protocol  routine lab orders  consents obtained  COVID PCR obtained

## 2022-10-05 NOTE — OB RN PATIENT PROFILE - NS_CIRCUMCISIONPLAN_OBGYN_ALL_OB
Would recommend tylenol but unfortunately cannot treat ear pain otherwise without being examined.    N/A

## 2022-10-05 NOTE — OB PROVIDER TRIAGE NOTE - HISTORY OF PRESENT ILLNESS
31 y/o  at 38.2 weeks gestation sent in from OBGYN office for NRFHT, with decelerations x2 on NST monitor. Pt endorses mild cramping. Denies ctx, lof, vb. Reports + FM    AP course c/b GDMA2, RH negative on Rhogam @ 30 weeks  NKDA  Current medications:  -PNV  -ASA  -Metformin 500mg BID  -Tums/Pepcid PRN  OBGYN history:  -PCOS  Medical history:  -ADHD  -exercise induced asthma (last used inhaler 2 weeks ago), denies intubations and hospitalizations  Denies surgical history  Psych history:  -Depression/anxiety - previously on Wellbutrin, last year hospitalized @ [facility] in attempts to self harm  Denies smoking, alcohol, and illicit drug use

## 2022-10-06 ENCOUNTER — TRANSCRIPTION ENCOUNTER (OUTPATIENT)
Age: 32
End: 2022-10-06

## 2022-10-06 DIAGNOSIS — Z04.9 ENCOUNTER FOR EXAMINATION AND OBSERVATION FOR UNSPECIFIED REASON: ICD-10-CM

## 2022-10-06 LAB
COVID-19 SPIKE DOMAIN AB INTERP: POSITIVE
COVID-19 SPIKE DOMAIN ANTIBODY RESULT: >250 U/ML — HIGH
GLUCOSE BLDC GLUCOMTR-MCNC: 104 MG/DL — HIGH (ref 70–99)
GLUCOSE BLDC GLUCOMTR-MCNC: 110 MG/DL — HIGH (ref 70–99)
GLUCOSE BLDC GLUCOMTR-MCNC: 86 MG/DL — SIGNIFICANT CHANGE UP (ref 70–99)
GLUCOSE BLDC GLUCOMTR-MCNC: 91 MG/DL — SIGNIFICANT CHANGE UP (ref 70–99)
GLUCOSE BLDC GLUCOMTR-MCNC: 92 MG/DL — SIGNIFICANT CHANGE UP (ref 70–99)
GLUCOSE BLDC GLUCOMTR-MCNC: 93 MG/DL — SIGNIFICANT CHANGE UP (ref 70–99)
GLUCOSE BLDC GLUCOMTR-MCNC: 95 MG/DL — SIGNIFICANT CHANGE UP (ref 70–99)
SARS-COV-2 IGG+IGM SERPL QL IA: >250 U/ML — HIGH
SARS-COV-2 IGG+IGM SERPL QL IA: POSITIVE
T PALLIDUM AB TITR SER: NEGATIVE — SIGNIFICANT CHANGE UP

## 2022-10-06 RX ORDER — OXYTOCIN 10 UNIT/ML
2 VIAL (ML) INJECTION
Qty: 30 | Refills: 0 | Status: DISCONTINUED | OUTPATIENT
Start: 2022-10-06 | End: 2022-10-07

## 2022-10-06 RX ADMIN — SODIUM CHLORIDE 3 MILLILITER(S): 9 INJECTION INTRAMUSCULAR; INTRAVENOUS; SUBCUTANEOUS at 06:35

## 2022-10-06 RX ADMIN — SODIUM CHLORIDE 125 MILLILITER(S): 9 INJECTION INTRAMUSCULAR; INTRAVENOUS; SUBCUTANEOUS at 04:45

## 2022-10-06 RX ADMIN — SODIUM CHLORIDE 125 MILLILITER(S): 9 INJECTION, SOLUTION INTRAVENOUS at 08:22

## 2022-10-06 RX ADMIN — SODIUM CHLORIDE 125 MILLILITER(S): 9 INJECTION INTRAMUSCULAR; INTRAVENOUS; SUBCUTANEOUS at 08:22

## 2022-10-06 RX ADMIN — SODIUM CHLORIDE 3 MILLILITER(S): 9 INJECTION INTRAMUSCULAR; INTRAVENOUS; SUBCUTANEOUS at 16:21

## 2022-10-06 RX ADMIN — Medication 2 MILLIUNIT(S)/MIN: at 21:19

## 2022-10-06 NOTE — CHART NOTE - NSCHARTNOTEFT_GEN_A_CORE
Patient was evaluated at bedside   ve: 4/50/ -2 AROM /IUPCS   Cat 1 tracing   continue 2 more doses of po cytotec then switch to Pitocin

## 2022-10-06 NOTE — OB PROVIDER LABOR PROGRESS NOTE - ASSESSMENT
A/P: IOL cat 2    - making cervical change  - transition to pitocin  - top off  - continue resuscitation    RON Lawrence, PGY-4  d/w Dr. Crouch

## 2022-10-06 NOTE — OB PROVIDER LABOR PROGRESS NOTE - ASSESSMENT
CNM OB Progress Note    Patient seen and evaluated at bedside.  Denies complaints.  Comfortable.     T(C): 36.7 (10-06-22 @ 09:13), Max: 36.8 (10-05-22 @ 17:16)  HR: 67 (10-06-22 @ 09:29) (58 - 111)  BP: 108/79 (10-06-22 @ 09:29) (92/46 - 140/65)  RR: 18 (10-05-22 @ 21:09) (16 - 18)  SpO2: --    SVE: 2/60-3        A/P 32y P0 @ 38.3wks  admitted for IOL for Category 2 tracing, GDMA2. RH neg, s/p Rhogam.      -Labor: Categor 1 now  -Fetus: EFW 6#7  -GBS neg  -Analgesia: n/a   -Induction with:     -reposition PRN  -Continue with PO Cytotec, last dose Cyto 40 mcg #2 held for 930am dose for tachycystole  -Continue to monitor with EFM & TOCO  -Recheck patient in 2-4 hours or PRN    Discussed with MD Jennie Silva

## 2022-10-06 NOTE — OB PROVIDER LABOR PROGRESS NOTE - ASSESSMENT
A/P: IOL Cat 2    - making cervical change on PO cytotec  - CB deferred at this time  - epidural PRN  - A2, FS well controlled    C. Diamant, PGY-4  d/w Dr. Chadwick A/P: IOL Cat 2    - making cervical change on PO cytotec  - CB deferred at this time  - epidural PRN  - A2, FS well controlled    C. Diamant, PGY-4  d/w Dr. Renner

## 2022-10-07 LAB — KLEIHAUER-BETKE CALCULATION: 0.07 % — SIGNIFICANT CHANGE UP

## 2022-10-07 RX ORDER — SIMETHICONE 80 MG/1
80 TABLET, CHEWABLE ORAL EVERY 4 HOURS
Refills: 0 | Status: DISCONTINUED | OUTPATIENT
Start: 2022-10-07 | End: 2022-10-09

## 2022-10-07 RX ORDER — METFORMIN HYDROCHLORIDE 850 MG/1
0 TABLET ORAL
Qty: 0 | Refills: 0 | DISCHARGE

## 2022-10-07 RX ORDER — OXYCODONE HYDROCHLORIDE 5 MG/1
5 TABLET ORAL
Refills: 0 | Status: DISCONTINUED | OUTPATIENT
Start: 2022-10-07 | End: 2022-10-09

## 2022-10-07 RX ORDER — AER TRAVELER 0.5 G/1
1 SOLUTION RECTAL; TOPICAL EVERY 4 HOURS
Refills: 0 | Status: DISCONTINUED | OUTPATIENT
Start: 2022-10-07 | End: 2022-10-09

## 2022-10-07 RX ORDER — ACETAMINOPHEN 500 MG
3 TABLET ORAL
Qty: 0 | Refills: 0 | DISCHARGE
Start: 2022-10-07

## 2022-10-07 RX ORDER — KETOROLAC TROMETHAMINE 30 MG/ML
30 SYRINGE (ML) INJECTION ONCE
Refills: 0 | Status: DISCONTINUED | OUTPATIENT
Start: 2022-10-07 | End: 2022-10-08

## 2022-10-07 RX ORDER — IBUPROFEN 200 MG
600 TABLET ORAL EVERY 6 HOURS
Refills: 0 | Status: COMPLETED | OUTPATIENT
Start: 2022-10-07 | End: 2023-09-05

## 2022-10-07 RX ORDER — DIBUCAINE 1 %
1 OINTMENT (GRAM) RECTAL EVERY 6 HOURS
Refills: 0 | Status: DISCONTINUED | OUTPATIENT
Start: 2022-10-07 | End: 2022-10-09

## 2022-10-07 RX ORDER — ASPIRIN/CALCIUM CARB/MAGNESIUM 324 MG
0 TABLET ORAL
Qty: 0 | Refills: 0 | DISCHARGE

## 2022-10-07 RX ORDER — IBUPROFEN 200 MG
600 TABLET ORAL EVERY 6 HOURS
Refills: 0 | Status: DISCONTINUED | OUTPATIENT
Start: 2022-10-07 | End: 2022-10-09

## 2022-10-07 RX ORDER — IBUPROFEN 200 MG
1 TABLET ORAL
Qty: 0 | Refills: 0 | DISCHARGE
Start: 2022-10-07

## 2022-10-07 RX ORDER — DIPHENHYDRAMINE HCL 50 MG
25 CAPSULE ORAL EVERY 6 HOURS
Refills: 0 | Status: DISCONTINUED | OUTPATIENT
Start: 2022-10-07 | End: 2022-10-09

## 2022-10-07 RX ORDER — PRAMOXINE HYDROCHLORIDE 150 MG/15G
1 AEROSOL, FOAM RECTAL EVERY 4 HOURS
Refills: 0 | Status: DISCONTINUED | OUTPATIENT
Start: 2022-10-07 | End: 2022-10-09

## 2022-10-07 RX ORDER — HYDROCORTISONE 1 %
1 OINTMENT (GRAM) TOPICAL EVERY 6 HOURS
Refills: 0 | Status: DISCONTINUED | OUTPATIENT
Start: 2022-10-07 | End: 2022-10-09

## 2022-10-07 RX ORDER — MAGNESIUM HYDROXIDE 400 MG/1
30 TABLET, CHEWABLE ORAL
Refills: 0 | Status: DISCONTINUED | OUTPATIENT
Start: 2022-10-07 | End: 2022-10-09

## 2022-10-07 RX ORDER — ALBUTEROL 90 UG/1
0 AEROSOL, METERED ORAL
Qty: 0 | Refills: 0 | DISCHARGE

## 2022-10-07 RX ORDER — ACETAMINOPHEN 500 MG
975 TABLET ORAL
Refills: 0 | Status: DISCONTINUED | OUTPATIENT
Start: 2022-10-07 | End: 2022-10-09

## 2022-10-07 RX ORDER — OXYCODONE HYDROCHLORIDE 5 MG/1
5 TABLET ORAL ONCE
Refills: 0 | Status: DISCONTINUED | OUTPATIENT
Start: 2022-10-07 | End: 2022-10-09

## 2022-10-07 RX ORDER — TETANUS TOXOID, REDUCED DIPHTHERIA TOXOID AND ACELLULAR PERTUSSIS VACCINE, ADSORBED 5; 2.5; 8; 8; 2.5 [IU]/.5ML; [IU]/.5ML; UG/.5ML; UG/.5ML; UG/.5ML
0.5 SUSPENSION INTRAMUSCULAR ONCE
Refills: 0 | Status: DISCONTINUED | OUTPATIENT
Start: 2022-10-07 | End: 2022-10-09

## 2022-10-07 RX ORDER — BENZOCAINE 10 %
1 GEL (GRAM) MUCOUS MEMBRANE EVERY 6 HOURS
Refills: 0 | Status: DISCONTINUED | OUTPATIENT
Start: 2022-10-07 | End: 2022-10-09

## 2022-10-07 RX ORDER — PRAMOXINE HYDROCHLORIDE 150 MG/15G
1 AEROSOL, FOAM RECTAL
Qty: 0 | Refills: 0 | DISCHARGE
Start: 2022-10-07

## 2022-10-07 RX ORDER — SODIUM CHLORIDE 9 MG/ML
3 INJECTION INTRAMUSCULAR; INTRAVENOUS; SUBCUTANEOUS EVERY 8 HOURS
Refills: 0 | Status: DISCONTINUED | OUTPATIENT
Start: 2022-10-07 | End: 2022-10-09

## 2022-10-07 RX ORDER — LANOLIN
1 OINTMENT (GRAM) TOPICAL EVERY 6 HOURS
Refills: 0 | Status: DISCONTINUED | OUTPATIENT
Start: 2022-10-07 | End: 2022-10-09

## 2022-10-07 RX ORDER — OXYTOCIN 10 UNIT/ML
333.33 VIAL (ML) INJECTION
Qty: 20 | Refills: 0 | Status: DISCONTINUED | OUTPATIENT
Start: 2022-10-07 | End: 2022-10-08

## 2022-10-07 RX ADMIN — Medication 600 MILLIGRAM(S): at 18:00

## 2022-10-07 RX ADMIN — Medication 600 MILLIGRAM(S): at 11:13

## 2022-10-07 RX ADMIN — SODIUM CHLORIDE 3 MILLILITER(S): 9 INJECTION INTRAMUSCULAR; INTRAVENOUS; SUBCUTANEOUS at 06:52

## 2022-10-07 RX ADMIN — Medication 600 MILLIGRAM(S): at 06:53

## 2022-10-07 RX ADMIN — Medication 600 MILLIGRAM(S): at 17:09

## 2022-10-07 RX ADMIN — SODIUM CHLORIDE 3 MILLILITER(S): 9 INJECTION INTRAMUSCULAR; INTRAVENOUS; SUBCUTANEOUS at 16:35

## 2022-10-07 RX ADMIN — Medication 600 MILLIGRAM(S): at 12:16

## 2022-10-07 RX ADMIN — SODIUM CHLORIDE 3 MILLILITER(S): 9 INJECTION INTRAMUSCULAR; INTRAVENOUS; SUBCUTANEOUS at 19:54

## 2022-10-07 RX ADMIN — Medication 600 MILLIGRAM(S): at 07:53

## 2022-10-07 NOTE — DISCHARGE NOTE OB - REDNESS, SWELLING, YELLOW-GREEN OR BLOODY DISCHARGE FROM YOUR INCISION
LVM for pt to callback if interested in moving up appt to today 09/30 @ 1215 or 130 if still open Statement Selected

## 2022-10-07 NOTE — OB RN DELIVERY SUMMARY - NS_SEPSISRSKCALC_OBGYN_ALL_OB_FT
EOS calculated successfully. EOS Risk Factor: 0.5/1000 live births ([facility] national incidence); GA=38w4d; Temp=98.6; ROM=12.25; GBS='Negative'; Antibiotics='No antibiotics or any antibiotics < 2 hrs prior to birth'

## 2022-10-07 NOTE — DISCHARGE NOTE OB - CARE PROVIDER_API CALL
Doris Crouch (DO)  Obstetrics and Gynecology  [address] [place] [address]  Phone: [phone]  Follow Up Time:

## 2022-10-07 NOTE — DISCHARGE NOTE OB - MEDICATION SUMMARY - MEDICATIONS TO TAKE
I will START or STAY ON the medications listed below when I get home from the hospital:    acetaminophen 325 mg oral tablet  -- 3 tab(s) by mouth every 8 hours, As Needed  -- Indication: For pain    ibuprofen 600 mg oral tablet  -- 1 tab(s) by mouth every 6 hours, As Needed  -- Indication: For pain    pramoxine 1% topical cream  -- 1 application on skin every 4 hours, As needed, Moderate Pain (4-6)  -- Indication: For pain   I will START or STAY ON the medications listed below when I get home from the hospital:    acetaminophen 325 mg oral tablet  -- 3 tab(s) by mouth every 6 hours, As Needed  -- Indication: For pain    ibuprofen 600 mg oral tablet  -- 1 tab(s) by mouth every 6 hours, As Needed  -- Indication: For pain    pramoxine 1% topical cream  -- 1 application on skin every 4 hours, As needed, Moderate Pain (4-6)  -- Indication: For pain   I will START or STAY ON the medications listed below when I get home from the hospital:    electronic breast pump  -- 1 unit(s) mucous membrane 4 times a day   -- Indication: For Postpartum    acetaminophen 325 mg oral tablet  -- 3 tab(s) by mouth every 6 hours, As Needed  -- Indication: For pain    ibuprofen 600 mg oral tablet  -- 1 tab(s) by mouth every 6 hours, As Needed  -- Indication: For pain    pramoxine 1% topical cream  -- 1 application on skin every 4 hours, As needed, Moderate Pain (4-6)  -- Indication: For pain

## 2022-10-07 NOTE — OB PROVIDER DELIVERY SUMMARY - NSPROVIDERDELIVERYNOTE_OBGYN_ALL_OB_FT
Patient found to be fully dilated and directed to push.  Spontaneous vaginal delivery of viable liveborn female infant.   Head, shoulders, and body delivered without complications.  Infant was suctioned and passed to mother.   Delayed cord clamping performed, then clamped and cut.   Placenta delivered intact with a 3-vessel cord.   Fundal massage was given and uterine fundus was noted to be firm.   Vaginal exam revealed an intact cervix, vaginal walls, and sulci.   Patient has a first  degree laceration that was repaired with 2-0 chromic suture.   Excellent hemostasis noted.  Patient was stable and started postpartum recovery in room.   Count was correct x 2.     Infant: female  Apgar: 9/9  QBL: 110cc  Weight: 7.4

## 2022-10-07 NOTE — DISCHARGE NOTE OB - PLAN OF CARE
at 38 weeks  GDMA2  depression and anxiety    routine post partum care with continued glucose monitoring

## 2022-10-07 NOTE — DISCHARGE NOTE OB - CARE PLAN
1 Principal Discharge DX:	 (normal spontaneous vaginal delivery)  Assessment and plan of treatment:	 at 38 weeks  GDMA2  depression and anxiety    routine post partum care with continued glucose monitoring

## 2022-10-07 NOTE — PROGRESS NOTE ADULT - ASSESSMENT
Assessment and Plan  PPD #0 s/p   Doing well and bonding with baby, support person at bedside  GDMa2  ·	f/u with 2hr gtt 4-12 weeks pp  h/o depression  ·	for SW consult- pending  Rh negative  ·	s/p rhogam  Encourage ambulation.  PP & PPD Instructions reviewed.  CPC.  D/C home tomorrow.

## 2022-10-07 NOTE — DISCHARGE NOTE OB - PATIENT PORTAL LINK FT
You can access the FollowMyHealth Patient Portal offered by [facility] by registering at the following website: [email or web address]. By joining [email or web address] FollowMyHealth portal, you will also be able to view your health information using other applications (apps) compatible with our system.

## 2022-10-07 NOTE — DISCHARGE NOTE OB - NS AS DC FOLLOWUP STROKE INST
PP education materials provided PP education materials provided/Influenza vaccination (VIS Pub Date: August 6, 2021)

## 2022-10-07 NOTE — DISCHARGE NOTE OB - MEDICATION SUMMARY - MEDICATIONS TO STOP TAKING
I will STOP taking the medications listed below when I get home from the hospital:    Aspirin Low Dose   I will STOP taking the medications listed below when I get home from the hospital:    ondansetron 4 mg oral tablet, disintegrating  -- 1 tab(s) by mouth every 8 hours, As Needed -for nausea    Aspirin Low Dose   I will STOP taking the medications listed below when I get home from the hospital:    metFORMIN 500 mg oral tablet    Aspirin Low Dose    ondansetron 4 mg oral tablet, disintegrating  -- 1 tab(s) by mouth every 8 hours, As Needed -for nausea

## 2022-10-07 NOTE — OB RN DELIVERY SUMMARY - NSSELHIDDEN_OBGYN_ALL_OB_FT
[NS_DeliveryAttending2_OBGYN_ALL_OB_FT:[id number]],[NS_DeliveryAssist1_OBGYN_ALL_OB_FT:[id number]],[NS_DeliveryRN_OBGYN_ALL_OB_FT:[id number]],[NS_DeliveryAttending1_OBGYN_ALL_OB_FT:[id number]]

## 2022-10-07 NOTE — LACTATION INITIAL EVALUATION - LACTATION INTERVENTIONS
Reviewed pages 35-45 in the POST PARTUM book/initiate/review safe skin-to-skin/initiate/review hand expression/initiate/review techniques for position and latch/initiate/review breast massage/compression/reviewed components of an effective feeding and at least 8 effective feedings per day required/reviewed importance of monitoring infant diapers, the breastfeeding log, and minimum output each day/reviewed risks of unnecessary formula supplementation/reviewed risks of artificial nipples/reviewed benefits and recommendations for rooming in/reviewed feeding on demand/by cue at least 8 times a day

## 2022-10-07 NOTE — OB RN DELIVERY SUMMARY - NS_FETALMONITOR_OBGYN_ALL_OB
"Chief Complaint   Patient presents with   • New Patient     Seizures       Problem List Items Addressed This Visit    None     Visit Diagnoses     Seizure (HCC)        Relevant Medications    levETIRAcetam (KEPPRA) 500 MG Tab    Localization-related epilepsy (HCC)        Relevant Medications    levETIRAcetam (KEPPRA) 500 MG Tab          History of present illness:  Thong Arzola 58 y.o. male presents today with wife, Anel, for seizure evaluation. PMHx: Hx of R MCA stroke s/p craniotomy/craniectomy in 2021 (residual deficit L spastic hemiplegia and L hemianopia), PAF on xarelto 20mg daily.     Pt was seen in the ER for new onset seizure on 2/12/22. He was sitting on the couch eating and his L arm locked up and he started convulsing and unresponsive for ~1-2 min. There was post-ictal confusion as he was \"out of it\" for ~10mins. No tongue biting or incontinence. He denies any auras. No staring off spells prior to this per wife. No known triggers. He was given keppra 500mg, 2 tabs BID. They report of side effects such as \"groggy\", \"loopy\", \"cloudy\" and lethargic. They called the doctor who prescribed the keppra and they were instructed to lower the dose to 500mg BID yesterday.     Mood is stable. Admits to feeling down at times but stays positive. No SI or HI    He drinks wine or beer daily (1bottle or glass). No cigarettes or recreational drug use.     He hasn't been taking vit D.     He is not driving.     He is doing therapy with ST/PT/OT and is seeing Dr. Dumont. He is taking crestor daily and is f/u with PCP regularly.  They will be f/u with neuro ophthalmology per wife.           Past medical history:   Past Medical History:   Diagnosis Date   • Afib (HCC)    • COVID-19    • Disorder of thyroid     hypo   • High cholesterol    • Psychiatric problem     reactive depression   • Stroke (HCC) 2021    right side MCA       Past surgical history:   Past Surgical History:   Procedure Laterality Date   • CRANIOPLASTY " Right 6/23/2021    Procedure: CRANIOPLASTY - FOR SKULL DEFECT;  Surgeon: Arthur Payton M.D.;  Location: SURGERY [facility];  Service: Neurosurgery   • CRANIOTOMY Right 4/3/2021    Procedure: CRANIOTOMY;  Surgeon: Arthur Payton M.D.;  Location: SURGERY [facility];  Service: Neurosurgery   • KNEE RECONSTRUCTION      left knee orthoscopic       Family history:   History reviewed. No pertinent family history.    Social history:   Social History     Socioeconomic History   • Marital status:      Spouse name: Not on file   • Number of children: Not on file   • Years of education: Not on file   • Highest education level: Not on file   Occupational History   • Not on file   Tobacco Use   • Smoking status: Never Smoker   • Smokeless tobacco: Never Used   Vaping Use   • Vaping Use: Never used   Substance and Sexual Activity   • Alcohol use: Yes     Comment: occ   • Drug use: No   • Sexual activity: Not on file   Other Topics Concern   • Not on file   Social History Narrative   • Not on file     Social Determinants of Health     Financial Resource Strain: Not on file   Food Insecurity: Not on file   Transportation Needs: Not on file   Physical Activity: Not on file   Stress: Not on file   Social Connections: Not on file   Intimate Partner Violence: Not on file   Housing Stability: Not on file       Current medications:   Current Outpatient Medications   Medication   • levETIRAcetam (KEPPRA) 500 MG Tab   • gabapentin (NEURONTIN) 100 MG Cap   • dantrolene (DANTRIUM) 25 MG Cap   • mirtazapine (REMERON) 15 MG TABLET DISPERSIBLE   • melatonin 3 MG Tab   • amLODIPine (NORVASC) 5 MG Tab   • acetaminophen (TYLENOL) 325 MG Tab   • thyroid (ARMOUR THYROID) 60 MG Tab   • atorvastatin (LIPITOR) 40 MG Tab   • metoprolol tartrate (LOPRESSOR) 25 MG Tab     No current facility-administered medications for this visit.       Medication Allergy:  No Known Allergies      Review of systems:     General: Denies fevers or chills, or  nightsweats, or generalized fatigue.    Head: Denies headaches or dizziness or lightheadedness  EENT: Denies vision changes, vision loss or pain, nasal secretion, nasal bleeding, difficulty swallowing, hearing loss, tinnitus, vertigo, ear pain  Respiratory: Denies shortness of breath, cough, sputum, or wheezing  Cardiac: Denies chest pain, palpitations, edema or syncope  Gastrointestinal: Denies nausea, vomiting, no abdominal pain or change in bowel habits, no melena or hematochezia  Urinary: Denies dysuria, frequency, hesitancy, or incontinence.  Dermatologic:  Denies new rash  Musculoskeletal: Denies muscle pain or swelling, no atrophy, no neck and back pain or stiffness.   Neurologic: Denies facial droopiness paresthesias, ataxia, change in speech or language, memory loss, abnormal movements.+ seizures, loss of consciousness. +L spastic hemiplegia  Psychiatric: Denies anxiety,  mood swings, suicidal or homicidal thoughts. +depression       Physical examination:   Vitals:    02/17/22 0851   BP: 136/82   BP Location: Right arm   Patient Position: Sitting   BP Cuff Size: Adult   Pulse: 77   Temp: 36.8 °C (98.2 °F)   TempSrc: Temporal   SpO2: 97%   Height: 1.829 m (6')     General: Patient in no acute distress, pleasant and cooperative.  HEENT: Normocephalic, no signs of acute trauma.   moist conjunctivae. Nares are patent. Oropharynx clear without lesions and normal  hard and soft palates.   Neck: Supple. There is normal range of motion.   Resp: clear to auscultation bilaterally. No wheezes or crackles.   CV: RRR, no murmurs.   Skin: no signs of acute rashes or trauma.   Musculoskeletal: joints exhibit full range of motion  Psychiatric: No hallucinatory behavior. No symptoms of depression or suicidal ideation. Mood and affect appear normal on exam.     NEUROLOGICAL EXAM:   Mental status, orientation: Awake, alert and fully oriented.   Speech and language: speech is clear and fluent. The patient is able to name,  repeat and comprehend.   Memory: There is intact recollection of recent and remote events.   Cranial nerve exam:   CN I: Not examined   CN II: PERRL.   CN III, IV, VI: EOMI; no nystagmus, +L hemianopsia  CN V: Facial sensation intact bilaterally   CN VII: face symmetric   CN VIII: hearing intact to finger rub bilaterally   CN IX, X: palate elevates symmetrically   CN XI: Symmetric shoulder shrug  CN XII: tongue midline. No signs of tongue biting or fasciculations   Motor exam: L spastic hemiplegia. Strength 5/5 in R extremities.   Sensory exam reveals normal sense of light touch in all extremities.   Deep tendon reflexes:  2+ throughout. Plantar responses are flexor. There is no clonus.   Coordination: Normal rapidly alternating movements in R hand  Gait: wheelchair bound      ANCILLARY DATA REVIEWED:       Lab Data Review:  Reviewed in chart. CBC, CMP    Records reviewed:   Reviewed in chart.    Imaging:   MRI brain 2021  Very large acute right MCA territory infarct as detailed above with small amount of petechial hemorrhage in the right insular region and right temporal lobe.     Punctate right thalamic lacunar infarct.     Right ICA and M1 MCA occlusion.    EEG:  EEG 4/26/21  This is an abnormal video EEG recording in the awake, drowsy/sleep state(s). The diffuse background slowing is consistent with mild encephalopathy. The presence of asymmetric PDR and sleep spindle along with the loss of fast frequency over the right hemisphere are consistent with known focal structural lesion. No epileptiform discharges or seizures were seen. This does not preclude a diagnosis of epilepsy.        ASSESSMENT AND PLAN:    1. Localization-related epilepsy (HCC)  - levETIRAcetam (KEPPRA) 500 MG Tab; Take 1 Tablet by mouth 2 times a day for 180 days.  Dispense: 60 Tablet; Refill: 5    2. History of stroke    3. Spastic hemiplegia of left dominant side as late effect of cerebral infarction (HCC)    4. Screening for  depression    5. Hospital discharge follow-up    6. 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 Cardioembolic etiology given PAF. On xarelto 20mg daily. He has residual deficit of L spastic hemiplegia and L hemianopia. He had his first seizure on 2/12/22 affecting his L side. He had convulsion. Duration: 1-2min. There was post-ictal confusion. No tongue biting or incontinence. No auras or triggers. He was started on high dose keppra and had side effects of lethargy, feeling groggy and cloudy. He was advised by the prescribing doctor yesterday to lower the dose to 500mg BID.     He drinks alcohol daily. No cigarettes or recreational drug use.     Mood is stable. Has depression but is staying positive. No suicidal or homicidal thoughts.     Past ASM's: none    Current ASM's: keppra 500mg BID.       Plan:  - Continue current dose of keppra for now. Aware of side effects including dizziness, drowsiness, fatigue and mood changes. He is at high risk for seizures given his structural abnormality that it is appropriate to continue ASM.      - Discussed avoidance of spell/sz triggers: alcohol, sleep deprivation, benadryl and stress.    - Discussed Vit D supplementation. Recommended taking 2000-5000u daily.    - Discussed driving restrictions. Pt is not driving.       -Continue f/u with PCP for stroke risk factor management and monitoring.     -Continue f/u with Dr. Dumont, PT/ST/OT    -Continue f/u with neuroophthalmology for L hemianopsia    -Labs to be checked for next appointment: none            FOLLOW-UP:   Return in about 4 weeks (around 3/17/2022).      EDUCATION AND COUNSELING:  -Education was provided to the patient and/or family regarding diagnosis and prognosis. The chronic and unpredictable nature of the condition were discussed. There is increased risk for additional events, which may carry potential for  significant injuries and death. Discussed frequent seizure triggers: sleep deprivation, medication non-compliance, use of illegal drugs/alcohol, stress, and others.   -We reviewed in detail the current antiepileptic regimen. Potential side effects of antiepileptics were discussed at length, including but no limited to: hypersensitivity reactions (rash and others, some of which can be fatal), visual field changes (some of which may be irreversible), glaucoma, diplopia, kidney stones, osteopenia/osteoporosis/bone fractures, hyperthermia/anhydrosis, hyponatremia, tremors/abnormal movements, ataxia, dizziness, fatigue, increased risk for falls, risk for cardiac arrhythmias/syncope, gastrointestinal side effects(hepatitis, pancreatitis, gastritis, ulcers), gingival hypertrophy/bleeding, drowsiness, sedation, anxiety/nervousness, increased risk for suicide, increased risk for depression, and psychosis.   -We also reviewed drug-drug interactions and their potential effect on seizure control and medication side effects.    -Recommend chronic vitamin D supplementation and regular exercise (if not contraindicated).   -Patient/family educated on risk for SUDEP (Sudden Death in Epilepsy). Counseling was provided on the importance of strict medication and follow up compliance. The patient/family understand the risks associated with non-adherence with the medical plan as outlined, including but not limited to an increased risk for breakthrough seizures, which may contribute to injuries, disability, status epilepticus, and even death.   -Counseling was also provided on potential effects of alcohol and other drugs, which may lower seizure threshold and/or affect the metabolism of antiepileptic drugs. We recommend avoidance of alcohol and illegal drugs.  -Avoid sleep deprivation.   -We extensively discussed the aspects related to safety in drivers who suffer from epilepsy. The patient is encourage to report to the Division of Motor  Vehicles of any condition and/or spells related to confusion, disorientation, and/or loss of awareness and/or loss of consciousness; as these may pose a safety issue if they occur while operating a motor vehicle. The patient and/or family are ultimately responsible for exercising caution and abiding to regulations in place.   -Other seizure precautions were discussed at length, including no diving, no skydiving, no climbing or exposure to unprotected heights, no unsupervised swimming, no Jacuzzi or bathing in bathtubs or deep bodies of water. The patient/family have been advised about risks for operating any machinery while suffering from seizures / syncope / epilepsy and/or while taking antiepileptic drugs.   -The patient understands and agrees that due to the complexity of his/her diagnosis, results of any testing and further recommendations will typically be discussed/made during a face to face encounter in my office. The patient and/or family further understands it is their responsibility to keep proper follow up.     Patient/family agree with plan, as outlined.         Jazz Davidson, MSN, APRN, FNP-C  [facility]  Office: [phone]  Fax: [phone]    BILLING DOCUMENTATION:   Total time spent including chart review before and after visit was 62. Over 50% of the time of the visit today was spent on counseling and or coordination of care wtih the patient and/or family, with greater than 50% of the total discussing my assessment and plan as stated above.        External [place]/Internal [place]/External [facility]

## 2022-10-07 NOTE — PROGRESS NOTE ADULT - ATTENDING COMMENTS
Assessment and Plan  PPD #0 s/p   Doing well and bonding with baby, support person at bedside  GDMa2  f/u with 2hr gtt 4-12 weeks pp  h/o depression  for SW consult- pending  Rh negative  s/p rhogam  Encourage ambulation.  PP & PPD Instructions reviewed.  CPC.  D/C home tomorrow.

## 2022-10-07 NOTE — OB NEONATOLOGY/PEDIATRICIAN DELIVERY SUMMARY - NSPEDSNEONOTESA_OBGYN_ALL_OB_FT
Called by OBGYN to attend induced vaginal delivery due to category 2 tracing. Baby is product of a 38.4 week gestation born to a  30 y.o. "Dad." His labs include Blood Type O- (s/p Rhogam), HIV-, RPR NR, HepB NR, GBS- on 9/3. His history is significant for PCOS, anxiety and depression. Pregnancy was complicated by GDMA2. AROM on 10/6 at 13:21 with clear fluid. Delivery complicated by category 2 tracing. Baby emerged crying and vigorous. Infant was brought to radiant warmer and warmed, dried, stimulated and suctioned. HR>100, normal respiratory effort with APGARS of **/**. Delayed cord clamping x60s performed. Resuscitation included bulb suction of oropharynx. Highest dad's temperature 37.3 C. EOS score: 0.16. Admit to NBN.    Dad plans to initiate breastfeeding and consents Hep B vaccine.    Physical Exam:  Gen: NAD, +grimace  HEENT: anterior fontanel open soft and flat, no cleft lip/palate, ears normal set, no ear pits or tags. no lesions in mouth/throat, nares clinically patent  Resp: no increased work of breathing, good air entry b/l, clear to auscultation bilaterally  Cardio: normal S1/S2, regular rate and rhythm, no murmur appreciated  Abd: soft, non tender, non distended, + bowel sounds, umbilical cord with 3 vessels  Back: spine midline, no sacral dimple or tuft of hair  Neuro: +grasp/suck/lea/palmar/plantar, normal tone  Extremities: negative garcia and ortolani, moving all extremities, full range of motion x 4, no crepitus  Skin: pink, warm, acrocyanosis  Genitals: Normal female anatomy, Bib 1, anus patent

## 2022-10-08 RX ORDER — INFLUENZA VIRUS VACCINE 15; 15; 15; 15 UG/.5ML; UG/.5ML; UG/.5ML; UG/.5ML
0.5 SUSPENSION INTRAMUSCULAR ONCE
Refills: 0 | Status: COMPLETED | OUTPATIENT
Start: 2022-10-08 | End: 2022-10-09

## 2022-10-08 RX ADMIN — Medication 600 MILLIGRAM(S): at 05:35

## 2022-10-08 RX ADMIN — Medication 975 MILLIGRAM(S): at 09:20

## 2022-10-08 RX ADMIN — Medication 600 MILLIGRAM(S): at 11:57

## 2022-10-08 RX ADMIN — Medication 600 MILLIGRAM(S): at 23:41

## 2022-10-08 RX ADMIN — Medication 975 MILLIGRAM(S): at 03:20

## 2022-10-08 RX ADMIN — SODIUM CHLORIDE 3 MILLILITER(S): 9 INJECTION INTRAMUSCULAR; INTRAVENOUS; SUBCUTANEOUS at 22:12

## 2022-10-08 RX ADMIN — Medication 600 MILLIGRAM(S): at 12:30

## 2022-10-08 RX ADMIN — Medication 600 MILLIGRAM(S): at 17:55

## 2022-10-08 RX ADMIN — Medication 600 MILLIGRAM(S): at 06:05

## 2022-10-08 RX ADMIN — SODIUM CHLORIDE 3 MILLILITER(S): 9 INJECTION INTRAMUSCULAR; INTRAVENOUS; SUBCUTANEOUS at 11:57

## 2022-10-08 RX ADMIN — Medication 975 MILLIGRAM(S): at 08:56

## 2022-10-08 RX ADMIN — Medication 975 MILLIGRAM(S): at 02:54

## 2022-10-08 RX ADMIN — Medication 600 MILLIGRAM(S): at 17:23

## 2022-10-08 RX ADMIN — SODIUM CHLORIDE 3 MILLILITER(S): 9 INJECTION INTRAMUSCULAR; INTRAVENOUS; SUBCUTANEOUS at 03:04

## 2022-10-08 RX ADMIN — Medication 1 TABLET(S): at 11:55

## 2022-10-09 VITALS
HEART RATE: 73 BPM | OXYGEN SATURATION: 100 % | RESPIRATION RATE: 18 BRPM | DIASTOLIC BLOOD PRESSURE: 70 MMHG | TEMPERATURE: 98 F | SYSTOLIC BLOOD PRESSURE: 120 MMHG

## 2022-10-09 RX ADMIN — INFLUENZA VIRUS VACCINE 0.5 MILLILITER(S): 15; 15; 15; 15 SUSPENSION INTRAMUSCULAR at 05:53

## 2022-10-09 RX ADMIN — Medication 975 MILLIGRAM(S): at 03:34

## 2022-10-09 RX ADMIN — Medication 1 TABLET(S): at 12:12

## 2022-10-09 RX ADMIN — Medication 600 MILLIGRAM(S): at 06:23

## 2022-10-09 RX ADMIN — Medication 600 MILLIGRAM(S): at 12:12

## 2022-10-09 RX ADMIN — Medication 600 MILLIGRAM(S): at 05:53

## 2022-10-09 RX ADMIN — Medication 600 MILLIGRAM(S): at 13:10

## 2022-10-09 RX ADMIN — Medication 600 MILLIGRAM(S): at 00:11

## 2022-10-09 RX ADMIN — Medication 975 MILLIGRAM(S): at 15:24

## 2022-10-09 RX ADMIN — Medication 975 MILLIGRAM(S): at 03:04

## 2022-10-09 NOTE — PROGRESS NOTE ADULT - SUBJECTIVE AND OBJECTIVE BOX
Post-partum Note,   She is a  32y woman who is now post-partum day: 2    Subjective:  The patient feels well.  She is ambulating.   She is tolerating regular diet.  She denies nausea and vomiting; denies fever.  She is voiding.  Her pain is controlled.  She reports normal postpartum bleeding.  She is breastfeeding.  She is formula feeding.    Physical exam:    Vital Signs Last 24 Hrs  T(C): 36.4 (09 Oct 2022 06:00), Max: 36.8 (08 Oct 2022 17:51)  T(F): 97.6 (09 Oct 2022 06:00), Max: 98.2 (08 Oct 2022 17:51)  HR: 81 (09 Oct 2022 06:00) (81 - 81)  BP: 109/- (09 Oct 2022 06:00) (109/- - 121/69)  BP(mean): 70 (09 Oct 2022 06:00) (70 - 70)  RR: 17 (09 Oct 2022 06:00) (17 - 18)  SpO2: 100% (09 Oct 2022 06:00) (99% - 100%)    Parameters below as of 09 Oct 2022 06:00  Patient On (Oxygen Delivery Method): room air        Gen: NAD  Breast: Soft, nontender, not engorged.  Abdomen: Soft, nontender, no distension , firm uterine fundus at umbilicus.  Pelvic: Normal lochia noted  Ext: No calf tenderness    LABS:      Rubella status:     Allergies    Cherries (Unknown)  No Known Drug Allergies  Raspberries (Unknown)    Intolerances      MEDICATIONS  (STANDING):  acetaminophen     Tablet .. 975 milliGRAM(s) Oral <User Schedule>  diphtheria/tetanus/pertussis (acellular) Vaccine (ADAcel) 0.5 milliLiter(s) IntraMuscular once  ibuprofen  Tablet. 600 milliGRAM(s) Oral every 6 hours  prenatal multivitamin 1 Tablet(s) Oral daily  sodium chloride 0.9% lock flush 3 milliLiter(s) IV Push every 8 hours    MEDICATIONS  (PRN):  benzocaine 20%/menthol 0.5% Spray 1 Spray(s) Topical every 6 hours PRN for Perineal discomfort  dibucaine 1% Ointment 1 Application(s) Topical every 6 hours PRN Perineal discomfort  diphenhydrAMINE 25 milliGRAM(s) Oral every 6 hours PRN Pruritus  hydrocortisone 1% Cream 1 Application(s) Topical every 6 hours PRN Moderate Pain (4-6)  lanolin Ointment 1 Application(s) Topical every 6 hours PRN nipple soreness  magnesium hydroxide Suspension 30 milliLiter(s) Oral two times a day PRN Constipation  oxyCODONE    IR 5 milliGRAM(s) Oral every 3 hours PRN Moderate to Severe Pain (4-10)  oxyCODONE    IR 5 milliGRAM(s) Oral once PRN Moderate to Severe Pain (4-10)  pramoxine 1%/zinc 5% Cream 1 Application(s) Topical every 4 hours PRN Moderate Pain (4-6)  simethicone 80 milliGRAM(s) Chew every 4 hours PRN Gas  witch hazel Pads 1 Application(s) Topical every 4 hours PRN Perineal discomfort        Assessment and Plan  PPD #2 s/p   Doing well.  Encourage ambulation.  PP & PPD Instructions reviewed.  CPC.  D/C home today        
Post-partum Note,   She is a  32y woman who is now post-partum day: 0    Subjective:  The patient feels well.  He is ambulating.   he is tolerating regular diet.  he denies nausea and vomiting; denies fever.  he is voiding.  His pain is controlled.  he reports normal postpartum bleeding.  he is breastfeeding.      Physical exam:    Vital Signs Last 24 Hrs  T(C): 36.7 (07 Oct 2022 10:30), Max: 37.3 (07 Oct 2022 01:55)  T(F): 98 (07 Oct 2022 10:30), Max: 99.1 (07 Oct 2022 01:55)  HR: 85 (07 Oct 2022 05:00) (45 - 119)  BP: 118/75 (07 Oct 2022 05:00) (90/53 - 127/70)  BP(mean): --  RR: 18 (07 Oct 2022 10:30) (18 - 20)  SpO2: 100% (07 Oct 2022 10:30) (89% - 100%)    Parameters below as of 07 Oct 2022 05:00  Patient On (Oxygen Delivery Method): room air        Gen: NAD  Breast: Soft, nontender, not engorged.  Abdomen: Soft, nontender, no distension , firm uterine fundus at umbilicus.  Pelvic: Normal lochia noted  Ext: No calf tenderness    LABS:                        10.7   9.83  )-----------( 353      ( 05 Oct 2022 18:40 )             34.8         Allergies    Cherries (Unknown)  No Known Drug Allergies  Raspberries (Unknown)      MEDICATIONS  (STANDING):  acetaminophen     Tablet .. 975 milliGRAM(s) Oral <User Schedule>  diphtheria/tetanus/pertussis (acellular) Vaccine (ADAcel) 0.5 milliLiter(s) IntraMuscular once  ibuprofen  Tablet. 600 milliGRAM(s) Oral every 6 hours  ketorolac   Injectable 30 milliGRAM(s) IV Push once  oxytocin Infusion 333.333 milliUNIT(s)/Min (1000 mL/Hr) IV Continuous <Continuous>  prenatal multivitamin 1 Tablet(s) Oral daily  sodium chloride 0.9% lock flush 3 milliLiter(s) IV Push every 8 hours    MEDICATIONS  (PRN):  benzocaine 20%/menthol 0.5% Spray 1 Spray(s) Topical every 6 hours PRN for Perineal discomfort  dibucaine 1% Ointment 1 Application(s) Topical every 6 hours PRN Perineal discomfort  diphenhydrAMINE 25 milliGRAM(s) Oral every 6 hours PRN Pruritus  hydrocortisone 1% Cream 1 Application(s) Topical every 6 hours PRN Moderate Pain (4-6)  lanolin Ointment 1 Application(s) Topical every 6 hours PRN nipple soreness  magnesium hydroxide Suspension 30 milliLiter(s) Oral two times a day PRN Constipation  oxyCODONE    IR 5 milliGRAM(s) Oral every 3 hours PRN Moderate to Severe Pain (4-10)  oxyCODONE    IR 5 milliGRAM(s) Oral once PRN Moderate to Severe Pain (4-10)  pramoxine 1%/zinc 5% Cream 1 Application(s) Topical every 4 hours PRN Moderate Pain (4-6)  simethicone 80 milliGRAM(s) Chew every 4 hours PRN Gas  witch hazel Pads 1 Application(s) Topical every 4 hours PRN Perineal discomfort              
in Full, Available to Patient    Progress Note:   · Provider Specialty	OB      · Subjective and Objective:   Post-partum Note,       Subjective:  The patient feels well.  He is ambulating.   he is tolerating regular diet.  he denies nausea and vomiting; denies fever.  he is voiding.  His pain is controlled.  he reports normal postpartum bleeding.  he is breastfeeding. Denies HA dizziness blurry vision or epigastric pain, Bonding well w   S/p Social work       Physical exam:      Vital Signs Last 24 Hrs  T(C): 36.7 (08 Oct 2022 06:00), Max: 36.9 (07 Oct 2022 18:00)  T(F): 98 (08 Oct 2022 06:00), Max: 98.4 (07 Oct 2022 18:00)  HR: 108 (08 Oct 2022 06:00) (79 - 108)  BP: 120/74 (08 Oct 2022 06:00) (95/68 - 120/74)  BP(mean): --  RR: 19 (08 Oct 2022 06:00) (18 - 19)  SpO2: 100% (08 Oct 2022 06:00) (99% - 100%)          Gen: NAD  Breast: Soft, nontender, not engorged.  Abdomen: Soft, nontender, no distension , firm uterine fundus at umbilicus.  Pelvic: Normal lochia noted  Ext: No calf tenderness    LABS:                        10.7   9.83  )-----------( 353      ( 05 Oct 2022 18:40 )             34.8         Allergies    Cherries (Unknown)  No Known Drug Allergies  Raspberries (Unknown)      MEDICATIONS  (STANDING):  acetaminophen     Tablet .. 975 milliGRAM(s) Oral <User Schedule>  diphtheria/tetanus/pertussis (acellular) Vaccine (ADAcel) 0.5 milliLiter(s) IntraMuscular once  ibuprofen  Tablet. 600 milliGRAM(s) Oral every 6 hours  ketorolac   Injectable 30 milliGRAM(s) IV Push once  oxytocin Infusion 333.333 milliUNIT(s)/Min (1000 mL/Hr) IV Continuous <Continuous>  prenatal multivitamin 1 Tablet(s) Oral daily  sodium chloride 0.9% lock flush 3 milliLiter(s) IV Push every 8 hours    MEDICATIONS  (PRN):  benzocaine 20%/menthol 0.5% Spray 1 Spray(s) Topical every 6 hours PRN for Perineal discomfort  dibucaine 1% Ointment 1 Application(s) Topical every 6 hours PRN Perineal discomfort  diphenhydrAMINE 25 milliGRAM(s) Oral every 6 hours PRN Pruritus  hydrocortisone 1% Cream 1 Application(s) Topical every 6 hours PRN Moderate Pain (4-6)  lanolin Ointment 1 Application(s) Topical every 6 hours PRN nipple soreness  magnesium hydroxide Suspension 30 milliLiter(s) Oral two times a day PRN Constipation  oxyCODONE    IR 5 milliGRAM(s) Oral every 3 hours PRN Moderate to Severe Pain (4-10)  oxyCODONE    IR 5 milliGRAM(s) Oral once PRN Moderate to Severe Pain (4-10)  pramoxine 1%/zinc 5% Cream 1 Application(s) Topical every 4 hours PRN Moderate Pain (4-6)  simethicone 80 milliGRAM(s) Chew every 4 hours PRN Gas  witch hazel Pads 1 Application(s) Topical every 4 hours PRN Perineal discomfort                  Assessment and Plan:   · Assessment	  Assessment and Plan  PPD #1 s/p   Doing well and bonding with baby, support person at bedside  GDMa2  ·	f/u with 2hr gtt 4-12 weeks pp  h/o depression  ·	S/p Social worl  Rh negative  ·	s/p rhogam  Encourage ambulation.  PP & PPD Instructions reviewed.  [facility].  D/C home     DR Miko camacho AGNP-c   [phone]

## 2022-11-28 NOTE — PSYCHIATRIC REHAB INITIAL EVALUATION - PRIMARY ROLES/RESPONSIBILITIES
Patient seen and examined during the G.I. –advance endoscopy rounds with the GI PA and GI fellow on service, case and plan discussed during rounds and with team, assessment and plan as above
wage earner, full time

## 2023-02-05 NOTE — DISCHARGE NOTE OB - NS MD DC FALL RISK RISK
Opt out For information on Fall & Injury Prevention, visit: [email or web address] OR  [email or web address] OR  https://www.cdc.gov/steadi/patient.html

## 2023-04-22 NOTE — OB RN PATIENT PROFILE - FALL HARM RISK - TYPE OF ASSESSMENT
BV test was positive.  Recommend oral metronidazole 500 mg b.i.d. for 7 days.  No alcohol until 48 hours after the last dose. Daily Assessment

## 2023-07-26 NOTE — BH TREATMENT PLAN - NSCMSPTSTRENGTHS_PSY_ALL_CORE
Compliance to treatment/Expressive of emotions/Financial stability/Future/goal oriented/Highly motivated for treatment/Intact employment/Intelligence/Physically healthy/Positive attitude/Self-reliant/Strong support system/Supportive family
Detail Level: Detailed

## 2023-08-25 NOTE — ED BEHAVIORAL HEALTH ASSESSMENT NOTE - DESCRIPTION
Pt's diarreha has gotten worse. Now what should   he do? Plz call back x   see BTCM note denied see hpi

## 2023-09-20 PROBLEM — Z87.42 PERSONAL HISTORY OF OTHER DISEASES OF THE FEMALE GENITAL TRACT: Chronic | Status: ACTIVE | Noted: 2022-10-05

## 2023-10-03 ENCOUNTER — LABORATORY RESULT (OUTPATIENT)
Age: 33
End: 2023-10-03

## 2023-10-04 ENCOUNTER — ASOB RESULT (OUTPATIENT)
Age: 33
End: 2023-10-04

## 2023-10-04 ENCOUNTER — APPOINTMENT (OUTPATIENT)
Dept: ANTEPARTUM | Facility: CLINIC | Age: 33
End: 2023-10-04
Payer: COMMERCIAL

## 2023-10-04 PROCEDURE — 76813 OB US NUCHAL MEAS 1 GEST: CPT

## 2023-10-04 PROCEDURE — 76801 OB US < 14 WKS SINGLE FETUS: CPT | Mod: 59

## 2023-10-30 ENCOUNTER — EMERGENCY (EMERGENCY)
Facility: HOSPITAL | Age: 33
LOS: 1 days | Discharge: HOME | End: 2023-10-30
Attending: STUDENT IN AN ORGANIZED HEALTH CARE EDUCATION/TRAINING PROGRAM | Admitting: STUDENT IN AN ORGANIZED HEALTH CARE EDUCATION/TRAINING PROGRAM
Payer: COMMERCIAL

## 2023-10-30 VITALS
TEMPERATURE: 98 F | OXYGEN SATURATION: 100 % | DIASTOLIC BLOOD PRESSURE: 78 MMHG | SYSTOLIC BLOOD PRESSURE: 120 MMHG | HEART RATE: 120 BPM | RESPIRATION RATE: 18 BRPM

## 2023-10-30 LAB
ALBUMIN SERPL ELPH-MCNC: 3.8 G/DL — SIGNIFICANT CHANGE UP (ref 3.3–5)
ALBUMIN SERPL ELPH-MCNC: 3.8 G/DL — SIGNIFICANT CHANGE UP (ref 3.3–5)
ALP SERPL-CCNC: 65 U/L — SIGNIFICANT CHANGE UP (ref 40–120)
ALP SERPL-CCNC: 65 U/L — SIGNIFICANT CHANGE UP (ref 40–120)
ALT FLD-CCNC: 8 U/L — SIGNIFICANT CHANGE UP (ref 4–33)
ALT FLD-CCNC: 8 U/L — SIGNIFICANT CHANGE UP (ref 4–33)
ANION GAP SERPL CALC-SCNC: 11 MMOL/L — SIGNIFICANT CHANGE UP (ref 7–14)
ANION GAP SERPL CALC-SCNC: 11 MMOL/L — SIGNIFICANT CHANGE UP (ref 7–14)
APPEARANCE UR: CLEAR — SIGNIFICANT CHANGE UP
APPEARANCE UR: CLEAR — SIGNIFICANT CHANGE UP
AST SERPL-CCNC: 13 U/L — SIGNIFICANT CHANGE UP (ref 4–32)
AST SERPL-CCNC: 13 U/L — SIGNIFICANT CHANGE UP (ref 4–32)
B PERT DNA SPEC QL NAA+PROBE: SIGNIFICANT CHANGE UP
B PERT DNA SPEC QL NAA+PROBE: SIGNIFICANT CHANGE UP
B PERT+PARAPERT DNA PNL SPEC NAA+PROBE: SIGNIFICANT CHANGE UP
B PERT+PARAPERT DNA PNL SPEC NAA+PROBE: SIGNIFICANT CHANGE UP
BASE EXCESS BLDV CALC-SCNC: -2.3 MMOL/L — LOW (ref -2–3)
BASE EXCESS BLDV CALC-SCNC: -2.3 MMOL/L — LOW (ref -2–3)
BASOPHILS # BLD AUTO: 0.03 K/UL — SIGNIFICANT CHANGE UP (ref 0–0.2)
BASOPHILS # BLD AUTO: 0.03 K/UL — SIGNIFICANT CHANGE UP (ref 0–0.2)
BASOPHILS NFR BLD AUTO: 0.3 % — SIGNIFICANT CHANGE UP (ref 0–2)
BASOPHILS NFR BLD AUTO: 0.3 % — SIGNIFICANT CHANGE UP (ref 0–2)
BILIRUB SERPL-MCNC: 0.2 MG/DL — SIGNIFICANT CHANGE UP (ref 0.2–1.2)
BILIRUB SERPL-MCNC: 0.2 MG/DL — SIGNIFICANT CHANGE UP (ref 0.2–1.2)
BILIRUB UR-MCNC: NEGATIVE — SIGNIFICANT CHANGE UP
BILIRUB UR-MCNC: NEGATIVE — SIGNIFICANT CHANGE UP
BLD GP AB SCN SERPL QL: NEGATIVE — SIGNIFICANT CHANGE UP
BLD GP AB SCN SERPL QL: NEGATIVE — SIGNIFICANT CHANGE UP
BLOOD GAS VENOUS COMPREHENSIVE RESULT: SIGNIFICANT CHANGE UP
BLOOD GAS VENOUS COMPREHENSIVE RESULT: SIGNIFICANT CHANGE UP
BORDETELLA PARAPERTUSSIS (RAPRVP): SIGNIFICANT CHANGE UP
BORDETELLA PARAPERTUSSIS (RAPRVP): SIGNIFICANT CHANGE UP
BUN SERPL-MCNC: 8 MG/DL — SIGNIFICANT CHANGE UP (ref 7–23)
BUN SERPL-MCNC: 8 MG/DL — SIGNIFICANT CHANGE UP (ref 7–23)
C PNEUM DNA SPEC QL NAA+PROBE: SIGNIFICANT CHANGE UP
C PNEUM DNA SPEC QL NAA+PROBE: SIGNIFICANT CHANGE UP
CALCIUM SERPL-MCNC: 8.7 MG/DL — SIGNIFICANT CHANGE UP (ref 8.4–10.5)
CALCIUM SERPL-MCNC: 8.7 MG/DL — SIGNIFICANT CHANGE UP (ref 8.4–10.5)
CHLORIDE BLDV-SCNC: 103 MMOL/L — SIGNIFICANT CHANGE UP (ref 96–108)
CHLORIDE BLDV-SCNC: 103 MMOL/L — SIGNIFICANT CHANGE UP (ref 96–108)
CHLORIDE SERPL-SCNC: 104 MMOL/L — SIGNIFICANT CHANGE UP (ref 98–107)
CHLORIDE SERPL-SCNC: 104 MMOL/L — SIGNIFICANT CHANGE UP (ref 98–107)
CO2 BLDV-SCNC: 23.7 MMOL/L — SIGNIFICANT CHANGE UP (ref 22–26)
CO2 BLDV-SCNC: 23.7 MMOL/L — SIGNIFICANT CHANGE UP (ref 22–26)
CO2 SERPL-SCNC: 21 MMOL/L — LOW (ref 22–31)
CO2 SERPL-SCNC: 21 MMOL/L — LOW (ref 22–31)
COLOR SPEC: YELLOW — SIGNIFICANT CHANGE UP
COLOR SPEC: YELLOW — SIGNIFICANT CHANGE UP
CREAT SERPL-MCNC: 0.72 MG/DL — SIGNIFICANT CHANGE UP (ref 0.5–1.3)
CREAT SERPL-MCNC: 0.72 MG/DL — SIGNIFICANT CHANGE UP (ref 0.5–1.3)
DIFF PNL FLD: NEGATIVE — SIGNIFICANT CHANGE UP
DIFF PNL FLD: NEGATIVE — SIGNIFICANT CHANGE UP
EGFR: 115 ML/MIN/1.73M2 — SIGNIFICANT CHANGE UP
EGFR: 115 ML/MIN/1.73M2 — SIGNIFICANT CHANGE UP
EOSINOPHIL # BLD AUTO: 0.1 K/UL — SIGNIFICANT CHANGE UP (ref 0–0.5)
EOSINOPHIL # BLD AUTO: 0.1 K/UL — SIGNIFICANT CHANGE UP (ref 0–0.5)
EOSINOPHIL NFR BLD AUTO: 1 % — SIGNIFICANT CHANGE UP (ref 0–6)
EOSINOPHIL NFR BLD AUTO: 1 % — SIGNIFICANT CHANGE UP (ref 0–6)
FLUAV SUBTYP SPEC NAA+PROBE: SIGNIFICANT CHANGE UP
FLUAV SUBTYP SPEC NAA+PROBE: SIGNIFICANT CHANGE UP
FLUBV RNA SPEC QL NAA+PROBE: SIGNIFICANT CHANGE UP
FLUBV RNA SPEC QL NAA+PROBE: SIGNIFICANT CHANGE UP
GAS PNL BLDV: 133 MMOL/L — LOW (ref 136–145)
GAS PNL BLDV: 133 MMOL/L — LOW (ref 136–145)
GLUCOSE BLDV-MCNC: 102 MG/DL — HIGH (ref 70–99)
GLUCOSE BLDV-MCNC: 102 MG/DL — HIGH (ref 70–99)
GLUCOSE SERPL-MCNC: 103 MG/DL — HIGH (ref 70–99)
GLUCOSE SERPL-MCNC: 103 MG/DL — HIGH (ref 70–99)
GLUCOSE UR QL: NEGATIVE MG/DL — SIGNIFICANT CHANGE UP
GLUCOSE UR QL: NEGATIVE MG/DL — SIGNIFICANT CHANGE UP
HADV DNA SPEC QL NAA+PROBE: SIGNIFICANT CHANGE UP
HADV DNA SPEC QL NAA+PROBE: SIGNIFICANT CHANGE UP
HCO3 BLDV-SCNC: 22 MMOL/L — SIGNIFICANT CHANGE UP (ref 22–29)
HCO3 BLDV-SCNC: 22 MMOL/L — SIGNIFICANT CHANGE UP (ref 22–29)
HCOV 229E RNA SPEC QL NAA+PROBE: SIGNIFICANT CHANGE UP
HCOV 229E RNA SPEC QL NAA+PROBE: SIGNIFICANT CHANGE UP
HCOV HKU1 RNA SPEC QL NAA+PROBE: SIGNIFICANT CHANGE UP
HCOV HKU1 RNA SPEC QL NAA+PROBE: SIGNIFICANT CHANGE UP
HCOV NL63 RNA SPEC QL NAA+PROBE: SIGNIFICANT CHANGE UP
HCOV NL63 RNA SPEC QL NAA+PROBE: SIGNIFICANT CHANGE UP
HCOV OC43 RNA SPEC QL NAA+PROBE: SIGNIFICANT CHANGE UP
HCOV OC43 RNA SPEC QL NAA+PROBE: SIGNIFICANT CHANGE UP
HCT VFR BLD CALC: 36 % — SIGNIFICANT CHANGE UP (ref 34.5–45)
HCT VFR BLD CALC: 36 % — SIGNIFICANT CHANGE UP (ref 34.5–45)
HCT VFR BLDA CALC: 38 % — SIGNIFICANT CHANGE UP (ref 34.5–46.5)
HCT VFR BLDA CALC: 38 % — SIGNIFICANT CHANGE UP (ref 34.5–46.5)
HGB BLD CALC-MCNC: 12.5 G/DL — SIGNIFICANT CHANGE UP (ref 11.7–16.1)
HGB BLD CALC-MCNC: 12.5 G/DL — SIGNIFICANT CHANGE UP (ref 11.7–16.1)
HGB BLD-MCNC: 12.3 G/DL — SIGNIFICANT CHANGE UP (ref 11.5–15.5)
HGB BLD-MCNC: 12.3 G/DL — SIGNIFICANT CHANGE UP (ref 11.5–15.5)
HMPV RNA SPEC QL NAA+PROBE: SIGNIFICANT CHANGE UP
HMPV RNA SPEC QL NAA+PROBE: SIGNIFICANT CHANGE UP
HPIV1 RNA SPEC QL NAA+PROBE: SIGNIFICANT CHANGE UP
HPIV1 RNA SPEC QL NAA+PROBE: SIGNIFICANT CHANGE UP
HPIV2 RNA SPEC QL NAA+PROBE: SIGNIFICANT CHANGE UP
HPIV2 RNA SPEC QL NAA+PROBE: SIGNIFICANT CHANGE UP
HPIV3 RNA SPEC QL NAA+PROBE: SIGNIFICANT CHANGE UP
HPIV3 RNA SPEC QL NAA+PROBE: SIGNIFICANT CHANGE UP
HPIV4 RNA SPEC QL NAA+PROBE: SIGNIFICANT CHANGE UP
HPIV4 RNA SPEC QL NAA+PROBE: SIGNIFICANT CHANGE UP
IANC: 7.82 K/UL — HIGH (ref 1.8–7.4)
IANC: 7.82 K/UL — HIGH (ref 1.8–7.4)
IMM GRANULOCYTES NFR BLD AUTO: 0.4 % — SIGNIFICANT CHANGE UP (ref 0–0.9)
IMM GRANULOCYTES NFR BLD AUTO: 0.4 % — SIGNIFICANT CHANGE UP (ref 0–0.9)
KETONES UR-MCNC: 15 MG/DL
KETONES UR-MCNC: 15 MG/DL
LACTATE BLDV-MCNC: 1.2 MMOL/L — SIGNIFICANT CHANGE UP (ref 0.5–2)
LACTATE BLDV-MCNC: 1.2 MMOL/L — SIGNIFICANT CHANGE UP (ref 0.5–2)
LEUKOCYTE ESTERASE UR-ACNC: NEGATIVE — SIGNIFICANT CHANGE UP
LEUKOCYTE ESTERASE UR-ACNC: NEGATIVE — SIGNIFICANT CHANGE UP
LYMPHOCYTES # BLD AUTO: 1.13 K/UL — SIGNIFICANT CHANGE UP (ref 1–3.3)
LYMPHOCYTES # BLD AUTO: 1.13 K/UL — SIGNIFICANT CHANGE UP (ref 1–3.3)
LYMPHOCYTES # BLD AUTO: 11.5 % — LOW (ref 13–44)
LYMPHOCYTES # BLD AUTO: 11.5 % — LOW (ref 13–44)
M PNEUMO DNA SPEC QL NAA+PROBE: SIGNIFICANT CHANGE UP
M PNEUMO DNA SPEC QL NAA+PROBE: SIGNIFICANT CHANGE UP
MCHC RBC-ENTMCNC: 29.4 PG — SIGNIFICANT CHANGE UP (ref 27–34)
MCHC RBC-ENTMCNC: 29.4 PG — SIGNIFICANT CHANGE UP (ref 27–34)
MCHC RBC-ENTMCNC: 34.2 GM/DL — SIGNIFICANT CHANGE UP (ref 32–36)
MCHC RBC-ENTMCNC: 34.2 GM/DL — SIGNIFICANT CHANGE UP (ref 32–36)
MCV RBC AUTO: 86.1 FL — SIGNIFICANT CHANGE UP (ref 80–100)
MCV RBC AUTO: 86.1 FL — SIGNIFICANT CHANGE UP (ref 80–100)
MONOCYTES # BLD AUTO: 0.73 K/UL — SIGNIFICANT CHANGE UP (ref 0–0.9)
MONOCYTES # BLD AUTO: 0.73 K/UL — SIGNIFICANT CHANGE UP (ref 0–0.9)
MONOCYTES NFR BLD AUTO: 7.4 % — SIGNIFICANT CHANGE UP (ref 2–14)
MONOCYTES NFR BLD AUTO: 7.4 % — SIGNIFICANT CHANGE UP (ref 2–14)
NEUTROPHILS # BLD AUTO: 7.82 K/UL — HIGH (ref 1.8–7.4)
NEUTROPHILS # BLD AUTO: 7.82 K/UL — HIGH (ref 1.8–7.4)
NEUTROPHILS NFR BLD AUTO: 79.4 % — HIGH (ref 43–77)
NEUTROPHILS NFR BLD AUTO: 79.4 % — HIGH (ref 43–77)
NITRITE UR-MCNC: NEGATIVE — SIGNIFICANT CHANGE UP
NITRITE UR-MCNC: NEGATIVE — SIGNIFICANT CHANGE UP
NRBC # BLD: 0 /100 WBCS — SIGNIFICANT CHANGE UP (ref 0–0)
NRBC # BLD: 0 /100 WBCS — SIGNIFICANT CHANGE UP (ref 0–0)
NRBC # FLD: 0 K/UL — SIGNIFICANT CHANGE UP (ref 0–0)
NRBC # FLD: 0 K/UL — SIGNIFICANT CHANGE UP (ref 0–0)
PCO2 BLDV: 38 MMHG — LOW (ref 39–52)
PCO2 BLDV: 38 MMHG — LOW (ref 39–52)
PH BLDV: 7.38 — SIGNIFICANT CHANGE UP (ref 7.32–7.43)
PH BLDV: 7.38 — SIGNIFICANT CHANGE UP (ref 7.32–7.43)
PH UR: 6 — SIGNIFICANT CHANGE UP (ref 5–8)
PH UR: 6 — SIGNIFICANT CHANGE UP (ref 5–8)
PLATELET # BLD AUTO: 290 K/UL — SIGNIFICANT CHANGE UP (ref 150–400)
PLATELET # BLD AUTO: 290 K/UL — SIGNIFICANT CHANGE UP (ref 150–400)
PO2 BLDV: 37 MMHG — SIGNIFICANT CHANGE UP (ref 25–45)
PO2 BLDV: 37 MMHG — SIGNIFICANT CHANGE UP (ref 25–45)
POTASSIUM BLDV-SCNC: 3.8 MMOL/L — SIGNIFICANT CHANGE UP (ref 3.5–5.1)
POTASSIUM BLDV-SCNC: 3.8 MMOL/L — SIGNIFICANT CHANGE UP (ref 3.5–5.1)
POTASSIUM SERPL-MCNC: 3.8 MMOL/L — SIGNIFICANT CHANGE UP (ref 3.5–5.3)
POTASSIUM SERPL-MCNC: 3.8 MMOL/L — SIGNIFICANT CHANGE UP (ref 3.5–5.3)
POTASSIUM SERPL-SCNC: 3.8 MMOL/L — SIGNIFICANT CHANGE UP (ref 3.5–5.3)
POTASSIUM SERPL-SCNC: 3.8 MMOL/L — SIGNIFICANT CHANGE UP (ref 3.5–5.3)
PROT SERPL-MCNC: 6.5 G/DL — SIGNIFICANT CHANGE UP (ref 6–8.3)
PROT SERPL-MCNC: 6.5 G/DL — SIGNIFICANT CHANGE UP (ref 6–8.3)
PROT UR-MCNC: SIGNIFICANT CHANGE UP MG/DL
PROT UR-MCNC: SIGNIFICANT CHANGE UP MG/DL
RAPID RVP RESULT: DETECTED
RAPID RVP RESULT: DETECTED
RBC # BLD: 4.18 M/UL — SIGNIFICANT CHANGE UP (ref 3.8–5.2)
RBC # BLD: 4.18 M/UL — SIGNIFICANT CHANGE UP (ref 3.8–5.2)
RBC # FLD: 13 % — SIGNIFICANT CHANGE UP (ref 10.3–14.5)
RBC # FLD: 13 % — SIGNIFICANT CHANGE UP (ref 10.3–14.5)
RH IG SCN BLD-IMP: NEGATIVE — SIGNIFICANT CHANGE UP
RH IG SCN BLD-IMP: NEGATIVE — SIGNIFICANT CHANGE UP
RSV RNA SPEC QL NAA+PROBE: SIGNIFICANT CHANGE UP
RSV RNA SPEC QL NAA+PROBE: SIGNIFICANT CHANGE UP
RV+EV RNA SPEC QL NAA+PROBE: DETECTED
RV+EV RNA SPEC QL NAA+PROBE: DETECTED
SAO2 % BLDV: 62.7 % — LOW (ref 67–88)
SAO2 % BLDV: 62.7 % — LOW (ref 67–88)
SARS-COV-2 RNA SPEC QL NAA+PROBE: SIGNIFICANT CHANGE UP
SARS-COV-2 RNA SPEC QL NAA+PROBE: SIGNIFICANT CHANGE UP
SODIUM SERPL-SCNC: 136 MMOL/L — SIGNIFICANT CHANGE UP (ref 135–145)
SODIUM SERPL-SCNC: 136 MMOL/L — SIGNIFICANT CHANGE UP (ref 135–145)
SP GR SPEC: 1.03 — SIGNIFICANT CHANGE UP (ref 1–1.03)
SP GR SPEC: 1.03 — SIGNIFICANT CHANGE UP (ref 1–1.03)
UROBILINOGEN FLD QL: 1 MG/DL — SIGNIFICANT CHANGE UP (ref 0.2–1)
UROBILINOGEN FLD QL: 1 MG/DL — SIGNIFICANT CHANGE UP (ref 0.2–1)
WBC # BLD: 9.85 K/UL — SIGNIFICANT CHANGE UP (ref 3.8–10.5)
WBC # BLD: 9.85 K/UL — SIGNIFICANT CHANGE UP (ref 3.8–10.5)
WBC # FLD AUTO: 9.85 K/UL — SIGNIFICANT CHANGE UP (ref 3.8–10.5)
WBC # FLD AUTO: 9.85 K/UL — SIGNIFICANT CHANGE UP (ref 3.8–10.5)

## 2023-10-30 PROCEDURE — 99285 EMERGENCY DEPT VISIT HI MDM: CPT

## 2023-10-30 PROCEDURE — 71046 X-RAY EXAM CHEST 2 VIEWS: CPT | Mod: 26

## 2023-10-30 PROCEDURE — 93010 ELECTROCARDIOGRAM REPORT: CPT

## 2023-10-30 NOTE — ED PROVIDER NOTE - NS ED ROS FT
CONSTITUTIONAL: see hpi   EYES: no visual changes, no eye pain  EARS: no ear drainage, no ear pain, no change in hearing  NOSE: +nasal congestion  MOUTH/THROAT: +sore throat  CV: No chest pain, no palpitations  RESP: see hpi  GI: No n/v/d, no abd pain  : no dysuria, no hematuria, no flank pain  MSK: no back pain, no extremity pain  SKIN: no rashes  NEURO: no headache, no focal weakness, no decreased sensation/parasthesias   PSYCHIATRIC: no known mental health issues

## 2023-10-30 NOTE — CONSULT NOTE ADULT - SUBJECTIVE AND OBJECTIVE BOX
GYN Consult Note    32yo G_P_ with PMH significant for * presents with *  HPI:      OB Hx:  Gyn Hx:   Last Menstrual Period    Name of GYN Physician:  Date of Last Pap:  History of Abnormal Pap:  Date of Last Mammogram:  Date of Last Colonoscopy:  Current OCP use:     PAST MEDICAL & SURGICAL HISTORY:  ADHD      Asthma      History of PCOS      No significant past surgical history          MEDICATIONS  (STANDING):    MEDICATIONS  (PRN):      Allergies    Raspberries (Unknown)  Cherries (Unknown)  No Known Drug Allergies    Intolerances        SOCIAL HISTORY:    FAMILY HISTORY:      REVIEW OF SYSTEMS  General: denies fevers, chills, tiredness  Skin/Breast: denies breast pain  Respiratory and Thorax: denies shortness of breath, denies cough  Cardiovascular: denies chest pain and denies palpitations  Gastrointestinal: denies abdominal pain, nausea/ vomiting	  Genitourinary: denies dysuria, increased urinary frequency, urgency	  Constitutional, Cardiovascular, Respiratory, Gastrointestinal, Genitourinary, Musculoskeletal and Integumentary review of systems are normal except as noted. 	      Vital Signs Last 24 Hrs  T(C): 37.7 (30 Oct 2023 21:51), Max: 37.7 (30 Oct 2023 21:51)  T(F): 99.8 (30 Oct 2023 21:51), Max: 99.8 (30 Oct 2023 21:51)  HR: 120 (30 Oct 2023 21:00) (120 - 120)  BP: 120/78 (30 Oct 2023 21:00) (120/78 - 120/78)  BP(mean): --  RR: 18 (30 Oct 2023 21:00) (18 - 18)  SpO2: 100% (30 Oct 2023 21:00) (100% - 100%)    Parameters below as of 30 Oct 2023 21:00  Patient On (Oxygen Delivery Method): room air        PHYSICAL EXAM:   Gen: NAD   Cardiovascular: Clinically well perfused  Respiratory: Breathing comfortably on RA  Abd: Soft, non-tender, non-distended  Pelvic: Cervix - closed/long, no CMT; Uterus - normal size, non tender; Adnexa - non tender, no palpable masses  Extremities: Non-tender, non-edematous; able to move all ext equally  Neuro: AOx3      LABS:                        12.3   9.85  )-----------( 290      ( 30 Oct 2023 21:51 )             36.0     10-30    136  |  104  |  8   ----------------------------<  103<H>  3.8   |  21<L>  |  0.72    Ca    8.7      30 Oct 2023 21:51    TPro  6.5  /  Alb  3.8  /  TBili  0.2  /  DBili  x   /  AST  13  /  ALT  8   /  AlkPhos  65  10-30      Urinalysis Basic - ( 30 Oct 2023 21:51 )    Color: x / Appearance: x / SG: x / pH: x  Gluc: 103 mg/dL / Ketone: x  / Bili: x / Urobili: x   Blood: x / Protein: x / Nitrite: x   Leuk Esterase: x / RBC: x / WBC x   Sq Epi: x / Non Sq Epi: x / Bacteria: x        RADIOLOGY & ADDITIONAL STUDIES:   GYN Consult Note    32yo  female-to-male transgender individual at 15 6/7 weeks by ORVILLE 23 presenting with fever, chills, body aches, cough, headahce x 2 days. Patient's symptoms started yesterday. Febrile to 101 F today at home. He has taken Tylenol q8h at home. He denies pelvic cramping but does endorse bright red spotting noted when he wipes. No dysuria, diarrhea, nausea/vomiting. No sick contacts. He is tolerating PO. Reports constipation which is per his baseline.      OB/Gyn: [facility]  ObHx: FT   c/b GDMA2  GynHX: Denies  MedHx: PCOS, Anxiety, Depression, ADHD  PSHx: denies  Meds: Tylenol PRN  All: NKDA        REVIEW OF SYSTEMS  General: + fevers, chills; denies tiredness  Skin/Breast: denies breast pain  Respiratory and Thorax: denies shortness of breath, + cough  Cardiovascular: denies chest pain and denies palpitations  Gastrointestinal: denies abdominal pain, nausea/ vomiting	  Genitourinary: denies dysuria, increased urinary frequency, urgency	  Constitutional, Cardiovascular, Respiratory, Gastrointestinal, Genitourinary, Musculoskeletal and Integumentary review of systems are normal except as noted. 	      Vital Signs Last 24 Hrs  T(C): 37.7 (30 Oct 2023 21:51), Max: 37.7 (30 Oct 2023 21:51)  T(F): 99.8 (30 Oct 2023 21:51), Max: 99.8 (30 Oct 2023 21:51)  HR: 120 (30 Oct 2023 21:00) (120 - 120)  BP: 120/78 (30 Oct 2023 21:00) (120/78 - 120/78)  BP(mean): --  RR: 18 (30 Oct 2023 21:00) (18 - 18)  SpO2: 100% (30 Oct 2023 21:00) (100% - 100%)    Parameters below as of 30 Oct 2023 21:00  Patient On (Oxygen Delivery Method): room air        PHYSICAL EXAM:   Patient offered pelvic exam and he would like to proceed  Chaperone: FIDENCIO Cho tech  Gen: NAD   Cardiovascular: Clinically well perfused  Respiratory: Breathing comfortably on RA, intermittent dry cough noted  Abd: Soft, non-tender, non-distended, no rebound/guarding; no CVA tenderness  Pelvic: Speculum exam with no blood in vaginal vault  Extremities: Non-tender, non-edematous; able to move all ext equally  Neuro: AOx3      LABS:                        12.3   9.85  )-----------( 290      ( 30 Oct 2023 21:51 )             36.0     10-30    136  |  104  |  8   ----------------------------<  103<H>  3.8   |  21<L>  |  0.72    Ca    8.7      30 Oct 2023 21:51    TPro  6.5  /  Alb  3.8  /  TBili  0.2  /  DBili  x   /  AST  13  /  ALT  8   /  AlkPhos  65  10-30      Urinalysis Basic - ( 30 Oct 2023 21:51 )    Color: x / Appearance: x / SG: x / pH: x  Gluc: 103 mg/dL / Ketone: x  / Bili: x / Urobili: x   Blood: x / Protein: x / Nitrite: x   Leuk Esterase: x / RBC: x / WBC x   Sq Epi: x / Non Sq Epi: x / Bacteria: x        RADIOLOGY & ADDITIONAL STUDIES:  FHR: +162 GYN Consult Note    32yo  female-to-male transgender individual at 15 6/7 weeks by ORVILLE 23 presenting with fever, chills, body aches, cough, headahce x 2 days. Patient's symptoms started yesterday. Febrile to 101 F today at home. He has taken Tylenol q8h at home. He denies pelvic cramping but does endorse bright red spotting noted when he wipes. No dysuria, diarrhea, nausea/vomiting. No sick contacts. He is tolerating PO. Reports constipation which is per his baseline.      OB/Gyn: [facility]  ObHx: FT   c/b GDMA2  GynHX: Denies  MedHx: PCOS, Anxiety, Depression, ADHD  PSHx: denies  Meds: Tylenol PRN  All: NKDA        REVIEW OF SYSTEMS  General: + fevers, chills; denies tiredness  Skin/Breast: denies breast pain  Respiratory and Thorax: denies shortness of breath, + cough  Cardiovascular: denies chest pain and denies palpitations  Gastrointestinal: denies abdominal pain, nausea/ vomiting	  Genitourinary: denies dysuria, increased urinary frequency, urgency	  Constitutional, Cardiovascular, Respiratory, Gastrointestinal, Genitourinary, Musculoskeletal and Integumentary review of systems are normal except as noted. 	      Vital Signs Last 24 Hrs  T(C): 37.7 (30 Oct 2023 21:51), Max: 37.7 (30 Oct 2023 21:51)  T(F): 99.8 (30 Oct 2023 21:51), Max: 99.8 (30 Oct 2023 21:51)  HR: 120 (30 Oct 2023 21:00) (120 - 120)  BP: 120/78 (30 Oct 2023 21:00) (120/78 - 120/78)  BP(mean): --  RR: 18 (30 Oct 2023 21:00) (18 - 18)  SpO2: 100% (30 Oct 2023 21:00) (100% - 100%)    Parameters below as of 30 Oct 2023 21:00  Patient On (Oxygen Delivery Method): room air        PHYSICAL EXAM:   Patient offered pelvic exam and he would like to proceed  Chaperone: FIDENCIO Cho tech  Gen: NAD   Cardiovascular: Clinically well perfused  Respiratory: Breathing comfortably on RA, intermittent dry cough noted  Abd: Soft, non-tender, non-distended, no rebound/guarding; no CVA tenderness  Pelvic: Speculum exam with no blood in vaginal vault  Extremities: Non-tender, non-edematous; able to move all ext equally  Neuro: AOx3      LABS:                        12.3   9.85  )-----------( 290      ( 30 Oct 2023 21:51 )             36.0     10-30    136  |  104  |  8   ----------------------------<  103<H>  3.8   |  21<L>  |  0.72    Ca    8.7      30 Oct 2023 21:51    TPro  6.5  /  Alb  3.8  /  TBili  0.2  /  DBili  x   /  AST  13  /  ALT  8   /  AlkPhos  65  10-30      Urinalysis Basic - ( 30 Oct 2023 21:51 )    Color: x / Appearance: x / SG: x / pH: x  Gluc: 103 mg/dL / Ketone: x  / Bili: x / Urobili: x   Blood: x / Protein: x / Nitrite: x   Leuk Esterase: x / RBC: x / WBC x   Sq Epi: x / Non Sq Epi: x / Bacteria: x        RADIOLOGY & ADDITIONAL STUDIES:  FHR: +162 GYN Consult Note    32yo  female-to-male transgender individual at 15 6/7 weeks by ORVILLE 23 presenting with fever, chills, body aches, cough, headahce x 2 days. Patient's symptoms started yesterday. Febrile to 101 F today at home. He has taken Tylenol q8h at home. He denies pelvic cramping but does endorse bright red spotting noted when he wipes. No dysuria, diarrhea, nausea/vomiting. No sick contacts. He is tolerating PO. Reports constipation which is per his baseline.      OB/Gyn: [facility]  ObHx: FT   c/b GDMA2  GynHX: Denies  MedHx: PCOS, Anxiety, Depression, ADHD  PSHx: denies  Meds: Tylenol PRN  All: NKDA        REVIEW OF SYSTEMS  General: + fevers, chills; denies tiredness  Skin/Breast: denies breast pain  Respiratory and Thorax: denies shortness of breath, + cough  Cardiovascular: denies chest pain and denies palpitations  Gastrointestinal: denies abdominal pain, nausea/ vomiting	  Genitourinary: denies dysuria, increased urinary frequency, urgency	  Constitutional, Cardiovascular, Respiratory, Gastrointestinal, Genitourinary, Musculoskeletal and Integumentary review of systems are normal except as noted. 	      Vital Signs Last 24 Hrs  T(C): 37.7 (30 Oct 2023 21:51), Max: 37.7 (30 Oct 2023 21:51)  T(F): 99.8 (30 Oct 2023 21:51), Max: 99.8 (30 Oct 2023 21:51)  HR: 120 (30 Oct 2023 21:00) (120 - 120)  BP: 120/78 (30 Oct 2023 21:00) (120/78 - 120/78)  BP(mean): --  RR: 18 (30 Oct 2023 21:00) (18 - 18)  SpO2: 100% (30 Oct 2023 21:00) (100% - 100%)    Parameters below as of 30 Oct 2023 21:00  Patient On (Oxygen Delivery Method): room air        PHYSICAL EXAM:   Patient offered pelvic exam and he would like to proceed  Chaperone: FIDENCIO Cho tech  Gen: NAD   Cardiovascular: Clinically well perfused  Respiratory: Breathing comfortably on RA, intermittent dry cough noted  Abd: Soft, non-tender, non-distended, no rebound/guarding; no CVA tenderness  Pelvic: Speculum exam with no blood in vaginal vault  Extremities: Non-tender, non-edematous; able to move all ext equally  Neuro: AOx3      LABS:                        12.3   9.85  )-----------( 290      ( 30 Oct 2023 21:51 )             36.0     10-30    136  |  104  |  8   ----------------------------<  103<H>  3.8   |  21<L>  |  0.72    Ca    8.7      30 Oct 2023 21:51    TPro  6.5  /  Alb  3.8  /  TBili  0.2  /  DBili  x   /  AST  13  /  ALT  8   /  AlkPhos  65  10-30      Urinalysis Basic - ( 30 Oct 2023 21:51 )    Color: x / Appearance: x / SG: x / pH: x  Gluc: 103 mg/dL / Ketone: x  / Bili: x / Urobili: x   Blood: x / Protein: x / Nitrite: x   Leuk Esterase: x / RBC: x / WBC x   Sq Epi: x / Non Sq Epi: x / Bacteria: x        RADIOLOGY & ADDITIONAL STUDIES:  FHR: +162

## 2023-10-30 NOTE — ED ADULT NURSE NOTE - OBJECTIVE STATEMENT
float rn. pt A&Ox4, amb at baseline. Pmh of  no hx of abortions or miscarriages. pt c/o pregnancy problem. States began vaginal spotting yesterday, not saturating pads/no clots. Unsure if bleeding is vaginal or r/t straining through bowel movements. Pt states they will be 16 weeks pregnant as of tomorrow with ORVILLE . Endorses symptoms began yesterday with pelvic pain radiating to lower backside associated with fevers chills and cough. Endorses having glucose tolerance test last saturday and sonogram week prior with no issues noted. Pt states has been taking tylenol q8, last dose taken at 7pm tonight. Denies chest pain, SOB, n/v, dizziness, numbness/tingling to hands/feet, urinary symptoms. Resp even unlabored, abd soft, pedal pulses 2+ bilaterally. 20G to R AC placed, septic workup sent, rectal temp checked 99.8, skin warm/dry/intact. Safety maintained. awaiting orders.

## 2023-10-30 NOTE — ED PROVIDER NOTE - PHYSICAL EXAMINATION
General: Alert and Orientated x 3. No apparent distress.  Head: Normocephalic and atraumatic.  Eyes: PERRLA with EOMI.  Nares: congested   Neck: Supple. Trachea midline.   Cardiac: Normal S1 and S2 w/ RRR. No murmurs appreciated. No JVD appreciated.  Pulmonary: CTA bilaterally. No increased WOB. No wheezes or crackles.  Abdominal: Soft,gravid,  non-tender. (+) bowel sounds appreciated in all 4 quadrants. No hepatosplenomegaly. pelvic deferred to ob team.   Neurologic: No focal sensory or motor deficits.  Musculoskeletal: Strength appropriate in all 4 extremities for age with no limited ROM.  Skin: Color appropriate for race. Intact, warm, and well-perfused.  Psychiatric: Appropriate mood and affect. No apparent risk to self or others.

## 2023-10-30 NOTE — ED ADULT TRIAGE NOTE - CHIEF COMPLAINT QUOTE
Pt , 16 weeks pregnant, ORVILLE , presenting to ED for lower back pain with pelvic pain and spotting starting yesterday. Endorses fever(Tmax 101) yesterday. Denies chest pain or palpitations. Per L&D, pt to be seen on L&D after EKG.  Hx: Male to female transgender. Pt , 16 weeks pregnant, ORVILLE , presenting to ED for lower back pain with pelvic pain and spotting starting yesterday. Endorses fever(Tmax 101) yesterday. Denies chest pain or palpitations. Per L&D, pt to be seen on L&D after EKG.  Hx: Male to female transgender.    Addendum: Pt EKG abnormal per ED attending, L&D called and patient to be seen in ED. Charge RN aware.

## 2023-10-30 NOTE — ED PROVIDER NOTE - PATIENT PORTAL LINK FT
You can access the FollowMyHealth Patient Portal offered by [facility] by registering at the following website: [email or web address]. By joining [facility]’s FollowMyHealth portal, you will also be able to view your health information using other applications (apps) compatible with our system.

## 2023-10-30 NOTE — ED ADULT NURSE NOTE - DOES PATIENT HAVE ADVANCE DIRECTIVE
Up to date on vaccines  Breast pump ordered  Discussed normal aches/pains of third trimester   labor precautions given  unk

## 2023-10-30 NOTE — ED PROVIDER NOTE - NSFOLLOWUPINSTRUCTIONS_ED_ALL_ED_FT
Viral Respiratory Infection    A viral respiratory infection is an illness that affects parts of the body used for breathing, like the lungs, nose, and throat. It is caused by a germ called a virus. Symptoms can include runny nose, coughing, sneezing, fatigue, body aches, sore throat, fever, or headache. Over the counter medicine can be used to manage the symptoms but the infection typically goes away on its own in 5 to 10 days.     SEEK IMMEDIATE MEDICAL CARE IF YOU HAVE ANY OF THE FOLLOWING SYMPTOMS: shortness of breath, chest pain, fever over 10 days, or lightheadedness/dizziness.     We evaluated you for vaginal bleeding today. We checked your hemoglobin and you do not need a blood transfusion at this time. Your situation can change quickly. If you develop bleeding that you are soaking through more than 2 pads per hour for 2 hours, if you develop lightheadedness/dizziness/feeling like you will pass out, chest pain, shortness of breath please return to the emergency room. We recommend that you make an appointment with your obstetrician-gynecologist for repeat evaluation and to ensure the symptoms are improving.      please see your OB/GYN doctor this week for repeated RhoGAM administration as needed.

## 2023-10-30 NOTE — ED PROVIDER NOTE - PROGRESS NOTE DETAILS
ANGELO NEELY: JESSICA franco. Paula NEELY: . Urine sent, enterorhino positive, pending OBGYN recs. Sundar NEELY: Pt consented for blood/RHOGAM. Will administer RHOGAM, ask pt to see doctor for additional RHOGAM injection in 3 days. Sundar NEELY: Pt is stable and ready for discharge. Strict return precautions given. All questions answered. Pt received RHOGAM and knows to f/u w/ OBGYN for further care.

## 2023-10-30 NOTE — ED PROVIDER NOTE - CLINICAL SUMMARY MEDICAL DECISION MAKING FREE TEXT BOX
33-year-old transgenderd male 16 weeks gestation , has not undergone any gender confirming surgeries or hormone therapy presenting to emergency department with URI symptoms x2 to 3 days and vaginal spotting today x1.  No chest pain or shortness of breath.  No abdominal pain nausea, vomiting.  Patient was sent in by his OB/GYN for further evaluation.  Patient is Rh-.  Vitals with tachycardia and temp 99.2, normotensive.  Exam with no acute distress, clear lung saltation, nasal congestion, gravid abdomen, no abdominal tense palpation, no edema.  This is likely a viral URI however will get labs today CBC, CMP, cultures.  We will get chest x-ray and type and screen to confirm Rh status.  OB consult.  We will get fetal heart rate.  Likely discharge with symptomatic care.  We will also likely need RhoGAM.

## 2023-10-30 NOTE — ED ADULT NURSE NOTE - CHIEF COMPLAINT QUOTE
Pt , 16 weeks pregnant, ORVILLE , presenting to ED for lower back pain with pelvic pain and spotting starting yesterday. Endorses fever(Tmax 101) yesterday. Denies chest pain or palpitations. Per L&D, pt to be seen on L&D after EKG.  Hx: Male to female transgender.    Addendum: Pt EKG abnormal per ED attending, L&D called and patient to be seen in ED. Charge RN aware.

## 2023-10-30 NOTE — ED PROVIDER NOTE - ATTENDING CONTRIBUTION TO CARE
Dr. Miller, Attending Physician-  I performed a face to face bedside interview with patient regarding history of present illness, review of symptoms and past medical history. I completed an independent physical exam.  I have discussed patient's plan of care with the resident.    33 F to M transgender, ~16 weeks pregnant, who presents with multiple complaints. For the past 24 hours, reports the following constellation of sxs - fevers, chills, cough, runny nose. Has been treating with APAP. Also with small amount of vaginal spotting. Did not get influenza vaccine this year. Tolerating PO. Stooling/voiding at baseline. No dysuria/hematuria. Physical: afebrile here, non-toxic appearing, tachy 110-120s, ctabl, abdomen soft. Plan: labs, urine, OBGYN, APAP, IVF. Dispo pending.

## 2023-10-30 NOTE — CONSULT NOTE ADULT - ASSESSMENT
*INCOMPLETE NOTE*    Please obtain FHR 32yo  female-to-male transgender individual at 15 6/7 weeks by ORVILLE 23 presenting URI symptoms x2 days, found to be positive for Rhinovirus. Patient reports vaginal spotting, however no evidence of bleeding on exam. Patient is Rh negative and in this situation, benefit of Rhogam administration outweighs risks, despite early gestational age. Fetal heart rate documented at 162 bpm.    Recommendations:  - Would recommend Rhogam administration given vaginal spotting and Rh negative status  - Avoid teratogenic medications (e.g. Motrin)  - Strict return precautions  - Patient cleared for discharge from Ob perspective. Rest of care per ED.    D/w Dr. Jennie Amezcua PGY2 34yo  female-to-male transgender individual at 15 6/7 weeks by ORVILLE 23 presenting URI symptoms x2 days, found to be positive for Rhinovirus. Patient reports vaginal spotting, however no evidence of bleeding on exam. Patient is Rh negative and in this situation, benefit of Rhogam administration outweighs risks, despite early gestational age. Fetal heart rate documented at 162 bpm.    Recommendations:  - Would recommend Rhogam administration given vaginal spotting and Rh negative status  - Avoid teratogenic medications (e.g. Motrin)  - Strict return precautions  - Patient cleared for discharge from Ob perspective. Rest of care per ED.    D/w Dr. Jennie Amezcua PGY2

## 2023-10-30 NOTE — ED PROVIDER NOTE - OBJECTIVE STATEMENT
Patient is a 33-year-old transgender male  16 weeks gestation has not undergone any gender confirming surgeries or on any hormone therapy presenting to the emergency department with URI symptoms x2 to 3 days as well as vaginal spotting today.  Patient has had nasal congestion nonproductive cough, subjective fever and sore throat.  Was told by his OB/GYN to come to the emergency department for further evaluation.  Today also noticed blood when wiping prior to arrival to ED.  Has not used any pads.  Last pregnancy without complications.  Patient is Rh-.  Denies any urinary changes, bowel changes, abdominal pain, chest pain, shortness of breath. Last ultrasound 2 weeks ago without acute findings. Patient was initially evaluated by triage however given EKG findings with tightness tachycardic was return from L&D to trauma A.

## 2023-10-31 VITALS
HEART RATE: 98 BPM | SYSTOLIC BLOOD PRESSURE: 115 MMHG | RESPIRATION RATE: 18 BRPM | DIASTOLIC BLOOD PRESSURE: 70 MMHG | OXYGEN SATURATION: 99 % | TEMPERATURE: 99 F

## 2023-10-31 NOTE — ED ADULT NURSE REASSESSMENT NOTE - NS ED NURSE REASSESS COMMENT FT1
pt in trauma a. A&Ox4, vitally stable. denies chest pain, SOB. n/v, headache, dizziness. pt provided and educated on reasoning for rhogam. rhogam card provided. IV removed pt to be discharged

## 2023-11-01 LAB
CULTURE RESULTS: SIGNIFICANT CHANGE UP
CULTURE RESULTS: SIGNIFICANT CHANGE UP
SPECIMEN SOURCE: SIGNIFICANT CHANGE UP
SPECIMEN SOURCE: SIGNIFICANT CHANGE UP

## 2023-11-05 LAB
CULTURE RESULTS: SIGNIFICANT CHANGE UP
SPECIMEN SOURCE: SIGNIFICANT CHANGE UP

## 2023-11-28 NOTE — OB RN TRIAGE NOTE - SUICIDE SCREENING QUESTION 1
Patient and responsible adult verbalized understanding of discharge instructions, sedation medication, and potential complications including pain. Patient instructed to call Doctor if complications occur. No

## 2023-11-30 ENCOUNTER — APPOINTMENT (OUTPATIENT)
Dept: ANTEPARTUM | Facility: CLINIC | Age: 33
End: 2023-11-30
Payer: COMMERCIAL

## 2023-11-30 ENCOUNTER — ASOB RESULT (OUTPATIENT)
Age: 33
End: 2023-11-30

## 2023-11-30 PROCEDURE — 76805 OB US >/= 14 WKS SNGL FETUS: CPT

## 2024-01-02 ENCOUNTER — OUTPATIENT (OUTPATIENT)
Dept: OUTPATIENT SERVICES | Facility: HOSPITAL | Age: 34
LOS: 1 days | Discharge: HOME | End: 2024-01-02
Payer: COMMERCIAL

## 2024-01-02 VITALS
DIASTOLIC BLOOD PRESSURE: 59 MMHG | HEART RATE: 112 BPM | TEMPERATURE: 98 F | RESPIRATION RATE: 17 BRPM | SYSTOLIC BLOOD PRESSURE: 107 MMHG

## 2024-01-02 VITALS — DIASTOLIC BLOOD PRESSURE: 57 MMHG | SYSTOLIC BLOOD PRESSURE: 95 MMHG | HEART RATE: 102 BPM

## 2024-01-02 DIAGNOSIS — O26.899 OTHER SPECIFIED PREGNANCY RELATED CONDITIONS, UNSPECIFIED TRIMESTER: ICD-10-CM

## 2024-01-02 PROCEDURE — 59025 FETAL NON-STRESS TEST: CPT | Mod: 26

## 2024-01-02 PROCEDURE — 99221 1ST HOSP IP/OBS SF/LOW 40: CPT | Mod: 25

## 2024-01-02 NOTE — OB RN TRIAGE NOTE - FALL HARM RISK - UNIVERSAL INTERVENTIONS
Bed in lowest position, wheels locked, appropriate side rails in place/Call bell, personal items and telephone in reach/Instruct patient to call for assistance before getting out of bed or chair/Non-slip footwear when patient is out of bed/[place] to call system/Physically safe environment - no spills, clutter or unnecessary equipment/Purposeful Proactive Rounding/Room/bathroom lighting operational, light cord in reach Bed in lowest position, wheels locked, appropriate side rails in place/Call bell, personal items and telephone in reach/Instruct patient to call for assistance before getting out of bed or chair/Non-slip footwear when patient is out of bed/[place] to call system/Physically safe environment - no spills, clutter or unnecessary equipment/Purposeful Proactive Rounding/Room/bathroom lighting operational, light cord in reach

## 2024-01-02 NOTE — OB PROVIDER TRIAGE NOTE - NSHPPHYSICALEXAM_GEN_ALL_CORE
ICU Vital Signs Last 24 Hrs  T(C): 36.9 (02 Jan 2024 16:10), Max: 36.9 (02 Jan 2024 16:10)  T(F): 98.4 (02 Jan 2024 16:10), Max: 98.4 (02 Jan 2024 16:10)  HR: 102 (02 Jan 2024 18:28) (96 - 112)  BP: 95/57 (02 Jan 2024 18:28) (91/51 - 107/59)  BP(mean): --  ABP: --  ABP(mean): --  RR: 17 (02 Jan 2024 16:10) (17 - 17)  SpO2: --    Gen: A&O x 3; NAD    Neuro- symmetrical facial features, no slurring of words  Pulm- breathing is unlabored  Abd exam- soft and nontender  Extremities- full range of motion x 4; no lower extremity edema    NST reactive with 145 baseline with accels and mod variability; no ctx's    Abd sono- images saved to sono- vtx; anterior placenta; OBED-24.59; 8/8 BPP

## 2024-01-02 NOTE — OB PROVIDER TRIAGE NOTE - HISTORY OF PRESENT ILLNESS
34yo transgender female  @ 25.0 wks SLIUP uncomp PNC here complaining of decreased FM since last night. Pt denies vag bleeding/ LOF/ ctx's. Pt reports feeling FM while outside in the waiting room.    PNC complicated by abnl GCT; normal GTT

## 2024-01-02 NOTE — OB PROVIDER TRIAGE NOTE - NS_ABDFINDING_OBGYN_ALL_OB
I called the patient to let her know that the thyroid ultrasound showed that the nodule did not change at all. I do not think we need to do anything about this. I left her a voicemail stating this. Soft, nontender

## 2024-01-02 NOTE — OB PROVIDER TRIAGE NOTE - NSOBPROVIDERNOTE_OBGYN_ALL_OB_FT
34yo transgender female  @ 25.0 wks presents with decreased FM since last night  -good FM felt while in waiting room and on NST  -NST reactive with 8/8 BPP  -pt was discussed with Dr Crouch  -pt was cleared for discharge home with instructions at 17:43

## 2024-01-04 DIAGNOSIS — E28.2 POLYCYSTIC OVARIAN SYNDROME: ICD-10-CM

## 2024-01-04 DIAGNOSIS — Z3A.25 25 WEEKS GESTATION OF PREGNANCY: ICD-10-CM

## 2024-01-04 DIAGNOSIS — O36.8120 DECREASED FETAL MOVEMENTS, SECOND TRIMESTER, NOT APPLICABLE OR UNSPECIFIED: ICD-10-CM

## 2024-01-04 DIAGNOSIS — O99.512 DISEASES OF THE RESPIRATORY SYSTEM COMPLICATING PREGNANCY, SECOND TRIMESTER: ICD-10-CM

## 2024-01-04 DIAGNOSIS — J45.909 UNSPECIFIED ASTHMA, UNCOMPLICATED: ICD-10-CM

## 2024-01-04 DIAGNOSIS — O99.282 ENDOCRINE, NUTRITIONAL AND METABOLIC DISEASES COMPLICATING PREGNANCY, SECOND TRIMESTER: ICD-10-CM

## 2024-01-10 NOTE — BH PSYCHOLOGY - CLINICIAN PSYCHOTHERAPY NOTE - NSBHPSYCHOLINT_PSY_A_CORE
Cognitive/behavioral therapy/Dynamic issues addressed/Problem-solving techniques discussed/Stress management/Supported coping skills/Supportive therapy
Droplet precautions...

## 2024-02-28 ENCOUNTER — OUTPATIENT (OUTPATIENT)
Dept: INPATIENT UNIT | Facility: HOSPITAL | Age: 34
LOS: 1 days | Discharge: HOME | End: 2024-02-28
Payer: COMMERCIAL

## 2024-02-28 VITALS
DIASTOLIC BLOOD PRESSURE: 72 MMHG | RESPIRATION RATE: 18 BRPM | SYSTOLIC BLOOD PRESSURE: 102 MMHG | TEMPERATURE: 98 F | HEART RATE: 130 BPM

## 2024-02-28 VITALS — HEART RATE: 96 BPM | SYSTOLIC BLOOD PRESSURE: 118 MMHG | DIASTOLIC BLOOD PRESSURE: 73 MMHG

## 2024-02-28 DIAGNOSIS — O26.899 OTHER SPECIFIED PREGNANCY RELATED CONDITIONS, UNSPECIFIED TRIMESTER: ICD-10-CM

## 2024-02-28 PROCEDURE — 59025 FETAL NON-STRESS TEST: CPT | Mod: 26

## 2024-02-28 PROCEDURE — 99221 1ST HOSP IP/OBS SF/LOW 40: CPT | Mod: 25

## 2024-02-28 NOTE — OB PROVIDER TRIAGE NOTE - BIRTH SEX
Per Dr. Espinoza regarding EBV: Check EBV VL monthly until it returns to baseline after which it can be checked every 6 months. I suspect it will normalize to her baseline soon and suspect the elevation is related to health stress.   Orders sent to local lab and placed in Epic.  Female

## 2024-02-28 NOTE — OB PROVIDER TRIAGE NOTE - NS_CHIEFCOMPLAINT_OBGYN_ALL_OB
PAST SURGICAL HISTORY:  Hernia umbilical hernia repair 2012    LAP-BAND surgery status     S/P appendectomy teenager    S/P  ,    S/P tonsillectomy childhood    Vein symptom right leg laser vein cleanse and bypass      Other

## 2024-02-28 NOTE — OB PROVIDER TRIAGE NOTE - NSHPPHYSICALEXAM_GEN_ALL_CORE
Vital Signs Last 24 Hrs  T(C): 36.8 (28 Feb 2024 19:23), Max: 36.8 (28 Feb 2024 19:22)  T(F): 98.2 (28 Feb 2024 19:23), Max: 98.24 (28 Feb 2024 19:22)  HR: 121 (28 Feb 2024 19:33) (121 - 130)  BP: 108/72 (28 Feb 2024 19:33) (102/72 - 108/72)  RR: 18 (28 Feb 2024 19:23) (18 - 18)    Assessment reveals VSS  General: Female sitting comfortably in no apparent distress  Neuro: No facial asymmetry, no slurred speech, moves all 4 extremities  Mood: Alert and lucid, appropriate mood and affect  A&Ox3  Lungs- clear bilateral  Heart- normal rate and regular rhythm  Extremities- Warm, Dry, no edema present, good pulses   Abdomen soft, NT, gravid  Cat 1 tracing reactive, occasional ctx on toco   Transabdominal Ultrasound- images saved ASOB        PLAN: Vital Signs Last 24 Hrs  T(C): 36.8 (28 Feb 2024 19:23), Max: 36.8 (28 Feb 2024 19:22)  T(F): 98.2 (28 Feb 2024 19:23), Max: 98.24 (28 Feb 2024 19:22)  HR: 121 (28 Feb 2024 19:33) (121 - 130)  BP: 108/72 (28 Feb 2024 19:33) (102/72 - 108/72)  RR: 18 (28 Feb 2024 19:23) (18 - 18)    Assessment reveals VSS  General: Female sitting comfortably in no apparent distress  Neuro: No facial asymmetry, no slurred speech, moves all 4 extremities  Mood: Alert and lucid, appropriate mood and affect  A&Ox3  Lungs- clear bilateral  Heart- normal rate and regular rhythm  Extremities- Warm, Dry, no edema present, good pulses   Abdomen soft, NT, gravid  Cat 1 tracing reactive, occasional ctx on toco   Transabdominal Ultrasound- images saved ASOB, vtx, OBED: 15.26, ant placenta, bpp8/8      PLAN: D/C Home

## 2024-02-28 NOTE — OB PROVIDER TRIAGE NOTE - ADDITIONAL INSTRUCTIONS
Follow up at next scheduled prenatal appointment 3/27/24  Return for decreased fetal movement, loss of fluid or vaginal bleeding  Increase oral hydration  Signs and Symptoms of Labor reviewed   Kick Counts Reviewed

## 2024-02-28 NOTE — OB PROVIDER TRIAGE NOTE - PLAN OF CARE
D/C Home  D/W Dr. Crouch  NST Reactive, BPP 8/8  No evidence of acute process   Normal fetal testing  Follow up at next scheduled prenatal appointment 3/27/24  Return for decreased fetal movement, loss of fluid or vaginal bleeding  Increase oral hydration  Signs and Symptoms of Labor reviewed   Kick Counts Reviewed

## 2024-02-28 NOTE — OB PROVIDER TRIAGE NOTE - HISTORY OF PRESENT ILLNESS
34y/o  @33.1wks presents with decreased fetal movement that she noticed at around 1pm, patient currently feels movement while in triage.   Denies LOF/VB  Denies abdominal pain or contractions    Allergies: Raspberries and Cherries- SOB and Itching  Medications: PNV, Iron

## 2024-02-29 DIAGNOSIS — F90.9 ATTENTION-DEFICIT HYPERACTIVITY DISORDER, UNSPECIFIED TYPE: ICD-10-CM

## 2024-02-29 DIAGNOSIS — E28.2 POLYCYSTIC OVARIAN SYNDROME: ICD-10-CM

## 2024-02-29 DIAGNOSIS — O99.283 ENDOCRINE, NUTRITIONAL AND METABOLIC DISEASES COMPLICATING PREGNANCY, THIRD TRIMESTER: ICD-10-CM

## 2024-02-29 DIAGNOSIS — J45.909 UNSPECIFIED ASTHMA, UNCOMPLICATED: ICD-10-CM

## 2024-02-29 DIAGNOSIS — Z86.32 PERSONAL HISTORY OF GESTATIONAL DIABETES: ICD-10-CM

## 2024-02-29 DIAGNOSIS — F41.8 OTHER SPECIFIED ANXIETY DISORDERS: ICD-10-CM

## 2024-02-29 DIAGNOSIS — O99.343 OTHER MENTAL DISORDERS COMPLICATING PREGNANCY, THIRD TRIMESTER: ICD-10-CM

## 2024-02-29 DIAGNOSIS — O99.513 DISEASES OF THE RESPIRATORY SYSTEM COMPLICATING PREGNANCY, THIRD TRIMESTER: ICD-10-CM

## 2024-02-29 DIAGNOSIS — Z3A.33 33 WEEKS GESTATION OF PREGNANCY: ICD-10-CM

## 2024-02-29 DIAGNOSIS — O36.8130 DECREASED FETAL MOVEMENTS, THIRD TRIMESTER, NOT APPLICABLE OR UNSPECIFIED: ICD-10-CM

## 2024-03-23 ENCOUNTER — NON-APPOINTMENT (OUTPATIENT)
Age: 34
End: 2024-03-23

## 2024-03-31 NOTE — BH PATIENT PROFILE - NSASFUNCLEVELADLTRANSFER_GEN_A_NUR
Problem: Adult Inpatient Plan of Care  Goal: Plan of Care Review  Outcome: Ongoing, Progressing  Goal: Patient-Specific Goal (Individualized)  Outcome: Ongoing, Progressing  Goal: Absence of Hospital-Acquired Illness or Injury  Outcome: Ongoing, Progressing  Goal: Optimal Comfort and Wellbeing  Outcome: Ongoing, Progressing  Goal: Readiness for Transition of Care  Outcome: Ongoing, Progressing     Problem: Bariatric Environmental Safety  Goal: Safety Maintained with Care  Outcome: Ongoing, Progressing     Problem:  Fall Injury Risk  Goal: Absence of Fall, Infant Drop and Related Injury  Outcome: Ongoing, Progressing     Problem: Infection  Goal: Absence of Infection Signs and Symptoms  Outcome: Ongoing, Progressing     Problem: Breastfeeding  Goal: Effective Breastfeeding  Outcome: Ongoing, Progressing     Problem: Adjustment to Role Transition (Postpartum  Delivery)  Goal: Successful Maternal Role Transition  Outcome: Ongoing, Progressing     Problem: Bleeding (Postpartum  Delivery)  Goal: Hemostasis  Outcome: Ongoing, Progressing     Problem: Infection (Postpartum  Delivery)  Goal: Absence of Infection Signs and Symptoms  Outcome: Ongoing, Progressing     Problem: Pain (Postpartum  Delivery)  Goal: Acceptable Pain Control  Outcome: Ongoing, Progressing     Problem: Postoperative Nausea and Vomiting (Postpartum  Delivery)  Goal: Nausea and Vomiting Relief  Outcome: Ongoing, Progressing     Problem: Postoperative Urinary Retention (Postpartum  Delivery)  Goal: Effective Urinary Elimination  Outcome: Ongoing, Progressing      0 = independent

## 2024-04-10 ENCOUNTER — TRANSCRIPTION ENCOUNTER (OUTPATIENT)
Age: 34
End: 2024-04-10

## 2024-04-10 ENCOUNTER — INPATIENT (INPATIENT)
Facility: HOSPITAL | Age: 34
LOS: 1 days | Discharge: HOME | End: 2024-04-12
Attending: OBSTETRICS & GYNECOLOGY | Admitting: OBSTETRICS & GYNECOLOGY

## 2024-04-10 VITALS
RESPIRATION RATE: 17 BRPM | DIASTOLIC BLOOD PRESSURE: 64 MMHG | HEART RATE: 110 BPM | WEIGHT: 169.98 LBS | TEMPERATURE: 99 F | HEIGHT: 63 IN | SYSTOLIC BLOOD PRESSURE: 111 MMHG

## 2024-04-10 PROBLEM — F41.9 ANXIETY DISORDER, UNSPECIFIED: Chronic | Status: ACTIVE | Noted: 2024-02-28

## 2024-04-10 LAB
BASOPHILS # BLD AUTO: 0.04 K/UL — SIGNIFICANT CHANGE UP (ref 0–0.2)
BASOPHILS NFR BLD AUTO: 0.3 % — SIGNIFICANT CHANGE UP (ref 0–2)
BLD GP AB SCN SERPL QL: NEGATIVE — SIGNIFICANT CHANGE UP
EOSINOPHIL # BLD AUTO: 0.18 K/UL — SIGNIFICANT CHANGE UP (ref 0–0.5)
EOSINOPHIL NFR BLD AUTO: 1.3 % — SIGNIFICANT CHANGE UP (ref 0–6)
GLUCOSE BLDC GLUCOMTR-MCNC: 102 MG/DL — HIGH (ref 70–99)
GLUCOSE BLDC GLUCOMTR-MCNC: 106 MG/DL — HIGH (ref 70–99)
GLUCOSE BLDC GLUCOMTR-MCNC: 108 MG/DL — HIGH (ref 70–99)
GLUCOSE BLDC GLUCOMTR-MCNC: 110 MG/DL — HIGH (ref 70–99)
GLUCOSE BLDC GLUCOMTR-MCNC: 110 MG/DL — HIGH (ref 70–99)
GLUCOSE BLDC GLUCOMTR-MCNC: 115 MG/DL — HIGH (ref 70–99)
GLUCOSE BLDC GLUCOMTR-MCNC: 116 MG/DL — HIGH (ref 70–99)
GLUCOSE BLDC GLUCOMTR-MCNC: 117 MG/DL — HIGH (ref 70–99)
GLUCOSE BLDC GLUCOMTR-MCNC: 121 MG/DL — HIGH (ref 70–99)
GLUCOSE BLDC GLUCOMTR-MCNC: 125 MG/DL — HIGH (ref 70–99)
GLUCOSE BLDC GLUCOMTR-MCNC: 131 MG/DL — HIGH (ref 70–99)
GLUCOSE BLDC GLUCOMTR-MCNC: 92 MG/DL — SIGNIFICANT CHANGE UP (ref 70–99)
GLUCOSE BLDC GLUCOMTR-MCNC: 93 MG/DL — SIGNIFICANT CHANGE UP (ref 70–99)
GLUCOSE BLDC GLUCOMTR-MCNC: 95 MG/DL — SIGNIFICANT CHANGE UP (ref 70–99)
GLUCOSE BLDC GLUCOMTR-MCNC: 99 MG/DL — SIGNIFICANT CHANGE UP (ref 70–99)
HCT VFR BLD CALC: 31.3 % — LOW (ref 34.5–45)
HGB BLD-MCNC: 9.6 G/DL — LOW (ref 11.5–15.5)
IANC: 10.46 K/UL — HIGH (ref 1.8–7.4)
IMM GRANULOCYTES NFR BLD AUTO: 0.8 % — SIGNIFICANT CHANGE UP (ref 0–0.9)
LYMPHOCYTES # BLD AUTO: 16.9 % — SIGNIFICANT CHANGE UP (ref 13–44)
LYMPHOCYTES # BLD AUTO: 2.41 K/UL — SIGNIFICANT CHANGE UP (ref 1–3.3)
MCHC RBC-ENTMCNC: 23 PG — LOW (ref 27–34)
MCHC RBC-ENTMCNC: 30.7 GM/DL — LOW (ref 32–36)
MCV RBC AUTO: 75.1 FL — LOW (ref 80–100)
MONOCYTES # BLD AUTO: 1.04 K/UL — HIGH (ref 0–0.9)
MONOCYTES NFR BLD AUTO: 7.3 % — SIGNIFICANT CHANGE UP (ref 2–14)
NEUTROPHILS # BLD AUTO: 10.46 K/UL — HIGH (ref 1.8–7.4)
NEUTROPHILS NFR BLD AUTO: 73.4 % — SIGNIFICANT CHANGE UP (ref 43–77)
NRBC # BLD: 0 /100 WBCS — SIGNIFICANT CHANGE UP (ref 0–0)
NRBC # FLD: 0 K/UL — SIGNIFICANT CHANGE UP (ref 0–0)
PLATELET # BLD AUTO: 385 K/UL — SIGNIFICANT CHANGE UP (ref 150–400)
RBC # BLD: 4.17 M/UL — SIGNIFICANT CHANGE UP (ref 3.8–5.2)
RBC # FLD: 17.9 % — HIGH (ref 10.3–14.5)
RH IG SCN BLD-IMP: NEGATIVE — SIGNIFICANT CHANGE UP
RUBV IGG SER-ACNC: 3.6 INDEX — SIGNIFICANT CHANGE UP
RUBV IGG SER-IMP: POSITIVE — SIGNIFICANT CHANGE UP
T PALLIDUM AB TITR SER: NEGATIVE — SIGNIFICANT CHANGE UP
WBC # BLD: 14.25 K/UL — HIGH (ref 3.8–10.5)
WBC # FLD AUTO: 14.25 K/UL — HIGH (ref 3.8–10.5)

## 2024-04-10 RX ORDER — ALBUTEROL 90 UG/1
2 AEROSOL, METERED ORAL
Qty: 0 | Refills: 0 | DISCHARGE
Start: 2024-04-10

## 2024-04-10 RX ORDER — SODIUM CHLORIDE 9 MG/ML
1000 INJECTION, SOLUTION INTRAVENOUS
Refills: 0 | Status: DISCONTINUED | OUTPATIENT
Start: 2024-04-10 | End: 2024-04-11

## 2024-04-10 RX ORDER — SODIUM CHLORIDE 9 MG/ML
1000 INJECTION, SOLUTION INTRAVENOUS
Refills: 0 | Status: DISCONTINUED | OUTPATIENT
Start: 2024-04-10 | End: 2024-04-10

## 2024-04-10 RX ORDER — ALBUTEROL 90 UG/1
2 AEROSOL, METERED ORAL EVERY 6 HOURS
Refills: 0 | Status: DISCONTINUED | OUTPATIENT
Start: 2024-04-10 | End: 2024-04-12

## 2024-04-10 RX ORDER — CALCIUM CARBONATE 500(1250)
1 TABLET ORAL
Refills: 0 | DISCHARGE

## 2024-04-10 RX ORDER — PRAMOXINE HYDROCHLORIDE 150 MG/15G
1 AEROSOL, FOAM RECTAL EVERY 4 HOURS
Refills: 0 | Status: DISCONTINUED | OUTPATIENT
Start: 2024-04-10 | End: 2024-04-12

## 2024-04-10 RX ORDER — DIBUCAINE 1 %
1 OINTMENT (GRAM) RECTAL EVERY 6 HOURS
Refills: 0 | Status: DISCONTINUED | OUTPATIENT
Start: 2024-04-10 | End: 2024-04-12

## 2024-04-10 RX ORDER — DEXTROSE 50 % IN WATER 50 %
25 SYRINGE (ML) INTRAVENOUS
Refills: 0 | Status: DISCONTINUED | OUTPATIENT
Start: 2024-04-10 | End: 2024-04-11

## 2024-04-10 RX ORDER — OXYCODONE HYDROCHLORIDE 5 MG/1
5 TABLET ORAL ONCE
Refills: 0 | Status: DISCONTINUED | OUTPATIENT
Start: 2024-04-10 | End: 2024-04-12

## 2024-04-10 RX ORDER — OXYTOCIN 10 UNIT/ML
VIAL (ML) INJECTION
Qty: 30 | Refills: 0 | Status: DISCONTINUED | OUTPATIENT
Start: 2024-04-10 | End: 2024-04-10

## 2024-04-10 RX ORDER — SIMETHICONE 80 MG/1
80 TABLET, CHEWABLE ORAL EVERY 4 HOURS
Refills: 0 | Status: DISCONTINUED | OUTPATIENT
Start: 2024-04-10 | End: 2024-04-12

## 2024-04-10 RX ORDER — KETOROLAC TROMETHAMINE 30 MG/ML
30 SYRINGE (ML) INJECTION ONCE
Refills: 0 | Status: DISCONTINUED | OUTPATIENT
Start: 2024-04-10 | End: 2024-04-10

## 2024-04-10 RX ORDER — DIBUCAINE 1 %
1 OINTMENT (GRAM) RECTAL
Qty: 0 | Refills: 0 | DISCHARGE
Start: 2024-04-10

## 2024-04-10 RX ORDER — SODIUM CHLORIDE 9 MG/ML
3 INJECTION INTRAMUSCULAR; INTRAVENOUS; SUBCUTANEOUS EVERY 8 HOURS
Refills: 0 | Status: DISCONTINUED | OUTPATIENT
Start: 2024-04-10 | End: 2024-04-12

## 2024-04-10 RX ORDER — ACETAMINOPHEN 500 MG
975 TABLET ORAL
Refills: 0 | Status: DISCONTINUED | OUTPATIENT
Start: 2024-04-10 | End: 2024-04-12

## 2024-04-10 RX ORDER — IBUPROFEN 200 MG
1 TABLET ORAL
Qty: 0 | Refills: 0 | DISCHARGE
Start: 2024-04-10

## 2024-04-10 RX ORDER — ACETAMINOPHEN 500 MG
3 TABLET ORAL
Qty: 0 | Refills: 0 | DISCHARGE
Start: 2024-04-10

## 2024-04-10 RX ORDER — ACETAMINOPHEN 500 MG
1000 TABLET ORAL ONCE
Refills: 0 | Status: COMPLETED | OUTPATIENT
Start: 2024-04-10 | End: 2024-04-10

## 2024-04-10 RX ORDER — HYDROCORTISONE 1 %
1 OINTMENT (GRAM) TOPICAL EVERY 6 HOURS
Refills: 0 | Status: DISCONTINUED | OUTPATIENT
Start: 2024-04-10 | End: 2024-04-12

## 2024-04-10 RX ORDER — SODIUM CHLORIDE 9 MG/ML
1000 INJECTION INTRAMUSCULAR; INTRAVENOUS; SUBCUTANEOUS
Refills: 0 | Status: DISCONTINUED | OUTPATIENT
Start: 2024-04-10 | End: 2024-04-10

## 2024-04-10 RX ORDER — OXYTOCIN 10 UNIT/ML
333.33 VIAL (ML) INJECTION
Qty: 20 | Refills: 0 | Status: COMPLETED | OUTPATIENT
Start: 2024-04-10 | End: 2024-04-10

## 2024-04-10 RX ORDER — TETANUS TOXOID, REDUCED DIPHTHERIA TOXOID AND ACELLULAR PERTUSSIS VACCINE, ADSORBED 5; 2.5; 8; 8; 2.5 [IU]/.5ML; [IU]/.5ML; UG/.5ML; UG/.5ML; UG/.5ML
0.5 SUSPENSION INTRAMUSCULAR ONCE
Refills: 0 | Status: DISCONTINUED | OUTPATIENT
Start: 2024-04-10 | End: 2024-04-12

## 2024-04-10 RX ORDER — DIPHENHYDRAMINE HCL 50 MG
25 CAPSULE ORAL EVERY 6 HOURS
Refills: 0 | Status: DISCONTINUED | OUTPATIENT
Start: 2024-04-10 | End: 2024-04-12

## 2024-04-10 RX ORDER — AER TRAVELER 0.5 G/1
1 SOLUTION RECTAL; TOPICAL
Qty: 0 | Refills: 0 | DISCHARGE
Start: 2024-04-10

## 2024-04-10 RX ORDER — SODIUM CHLORIDE 9 MG/ML
1000 INJECTION INTRAMUSCULAR; INTRAVENOUS; SUBCUTANEOUS ONCE
Refills: 0 | Status: COMPLETED | OUTPATIENT
Start: 2024-04-10 | End: 2024-04-10

## 2024-04-10 RX ORDER — CITRIC ACID/SODIUM CITRATE 300-500 MG
15 SOLUTION, ORAL ORAL EVERY 6 HOURS
Refills: 0 | Status: DISCONTINUED | OUTPATIENT
Start: 2024-04-10 | End: 2024-04-10

## 2024-04-10 RX ORDER — BENZOCAINE 10 %
1 GEL (GRAM) MUCOUS MEMBRANE EVERY 6 HOURS
Refills: 0 | Status: DISCONTINUED | OUTPATIENT
Start: 2024-04-10 | End: 2024-04-12

## 2024-04-10 RX ORDER — AER TRAVELER 0.5 G/1
1 SOLUTION RECTAL; TOPICAL EVERY 4 HOURS
Refills: 0 | Status: DISCONTINUED | OUTPATIENT
Start: 2024-04-10 | End: 2024-04-12

## 2024-04-10 RX ORDER — DEXTROSE 50 % IN WATER 50 %
50 SYRINGE (ML) INTRAVENOUS
Refills: 0 | Status: DISCONTINUED | OUTPATIENT
Start: 2024-04-10 | End: 2024-04-11

## 2024-04-10 RX ORDER — INSULIN HUMAN 100 [IU]/ML
1 INJECTION, SOLUTION SUBCUTANEOUS
Qty: 100 | Refills: 0 | Status: DISCONTINUED | OUTPATIENT
Start: 2024-04-10 | End: 2024-04-11

## 2024-04-10 RX ORDER — LANOLIN
1 OINTMENT (GRAM) TOPICAL EVERY 6 HOURS
Refills: 0 | Status: DISCONTINUED | OUTPATIENT
Start: 2024-04-10 | End: 2024-04-12

## 2024-04-10 RX ORDER — CHLORHEXIDINE GLUCONATE 213 G/1000ML
1 SOLUTION TOPICAL DAILY
Refills: 0 | Status: DISCONTINUED | OUTPATIENT
Start: 2024-04-10 | End: 2024-04-10

## 2024-04-10 RX ORDER — OXYCODONE HYDROCHLORIDE 5 MG/1
5 TABLET ORAL
Refills: 0 | Status: DISCONTINUED | OUTPATIENT
Start: 2024-04-10 | End: 2024-04-12

## 2024-04-10 RX ORDER — OXYTOCIN 10 UNIT/ML
41.67 VIAL (ML) INJECTION
Qty: 20 | Refills: 0 | Status: DISCONTINUED | OUTPATIENT
Start: 2024-04-10 | End: 2024-04-11

## 2024-04-10 RX ORDER — IBUPROFEN 200 MG
600 TABLET ORAL EVERY 6 HOURS
Refills: 0 | Status: COMPLETED | OUTPATIENT
Start: 2024-04-10 | End: 2025-03-09

## 2024-04-10 RX ORDER — MAGNESIUM HYDROXIDE 400 MG/1
30 TABLET, CHEWABLE ORAL
Refills: 0 | Status: DISCONTINUED | OUTPATIENT
Start: 2024-04-10 | End: 2024-04-12

## 2024-04-10 RX ADMIN — Medication 30 MILLIGRAM(S): at 21:43

## 2024-04-10 RX ADMIN — Medication 30 MILLIGRAM(S): at 22:13

## 2024-04-10 RX ADMIN — SODIUM CHLORIDE 3 MILLILITER(S): 9 INJECTION INTRAMUSCULAR; INTRAVENOUS; SUBCUTANEOUS at 19:50

## 2024-04-10 RX ADMIN — SODIUM CHLORIDE 100 MILLILITER(S): 9 INJECTION, SOLUTION INTRAVENOUS at 07:00

## 2024-04-10 RX ADMIN — Medication 1000 MILLIGRAM(S): at 05:30

## 2024-04-10 RX ADMIN — SODIUM CHLORIDE 25 MILLILITER(S): 9 INJECTION, SOLUTION INTRAVENOUS at 07:00

## 2024-04-10 RX ADMIN — Medication 1000 MILLIUNIT(S)/MIN: at 18:32

## 2024-04-10 RX ADMIN — Medication 125 MILLIUNIT(S)/MIN: at 21:43

## 2024-04-10 RX ADMIN — CHLORHEXIDINE GLUCONATE 1 APPLICATION(S): 213 SOLUTION TOPICAL at 14:12

## 2024-04-10 RX ADMIN — INSULIN HUMAN 1 UNIT(S)/HR: 100 INJECTION, SOLUTION SUBCUTANEOUS at 05:24

## 2024-04-10 RX ADMIN — SODIUM CHLORIDE 125 MILLILITER(S): 9 INJECTION INTRAMUSCULAR; INTRAVENOUS; SUBCUTANEOUS at 03:00

## 2024-04-10 RX ADMIN — Medication 2 MILLIUNIT(S)/MIN: at 09:11

## 2024-04-10 RX ADMIN — Medication 400 MILLIGRAM(S): at 05:05

## 2024-04-10 RX ADMIN — SODIUM CHLORIDE 1000 MILLILITER(S): 9 INJECTION INTRAMUSCULAR; INTRAVENOUS; SUBCUTANEOUS at 04:37

## 2024-04-10 NOTE — OB PROVIDER H&P - NSLOWPPHRISK_OBGYN_A_OB
No previous uterine incision/William Pregnancy/Less than or equal to 4 previous vaginal births/No known bleeding disorder

## 2024-04-10 NOTE — OB RN PATIENT PROFILE - AS TEMP SITE
Continued Stay Note  [facility]     Patient Name: Joy Bueno  MRN: [id number]  Today's Date: 8/8/2017    Admit Date: 8/3/2017          Discharge Plan       08/08/17 1632    Case Management/Social Work Plan    Plan discharge plan    Patient/Family In Agreement With Plan yes    Additional Comments Plan is to return to [facility] when medically ready for discharge. Ambulance attentively arranged for 3pm tomorrow, 8/9 if medically ready for discharge. CM will cont to follow.              Discharge Codes     None            Lucy Winkler RN     oral

## 2024-04-10 NOTE — OB PROVIDER H&P - HISTORY OF PRESENT ILLNESS
R1 Admission H&P    Subjective  HPI: 33yoFTM (he/him pronouns)  at 39w1d gestational age presents for scheduled IOL for GDMA2. Patient takes metformin 1000qhs. Fasting fingersticks have been 95-100s.  +FM. -LOF. -CTXs. -VB. Pt denies any other concerns.  Of note, patient was treated with Ursodiol 300mg TID for suspected ICP in this pregnancy (bile acids wnl 3). States that the pruritis has been resolving.    – PNC: GDMA2 (1000qhs). Polyhydramnios (OBED 24). GBS neg. EFW 3842g by wendie.  – OBHx: Term  7#7 c/b GDMA2  – GynHx: PCOS. denies fibroids, Hx of abnormal pap smears, Hx of STIs  – PMH: Asthma (no hospitalization or intubations, last albuterol use 2-3 weeks ago). Denies HTN, DM, cardiac disease, thyroid disorders, Hx of blood clots, bleeding disorders  – PSH: denies  – Psych: Depression (unmedicated, does not see a therapist)  – Social: denies EtOH, smoking, recreational drug use  – Meds: PNV, Metformin 1000qhs, Albuterol PRN, Ursodiol 300mg TID, Pepcid, FE  – Allergies: NKDA  – Will accept blood transfusions? Yes R1 Admission H&P    Subjective  HPI: 33yoFTM (he/him pronouns)  at 39w1d gestational age presents for scheduled IOL for GDMA2. Patient takes metformin 1000qhs. Fasting fingersticks have been 95-100s.  +FM. -LOF. -CTXs. -VB. Pt denies any other concerns.  Of note, patient was treated with Ursodiol 300mg TID for suspected ICP in this pregnancy (bile acids wnl 3). States that the pruritis has been resolving.    – PNC: GDMA2 (1000qhs). Polyhydramnios (OBED 24). GBS neg. EFW 3842g by wendie.  – OBHx: Term  7#7 () c/b GDMA2  – GynHx: PCOS. denies fibroids, Hx of abnormal pap smears, Hx of STIs  – PMH: Asthma (no hospitalization or intubations, last albuterol use 2-3 weeks ago). Denies HTN, DM, cardiac disease, thyroid disorders, Hx of blood clots, bleeding disorders  – PSH: denies  – Psych: Depression (unmedicated, does not see a therapist)  – Social: denies EtOH, smoking, recreational drug use  – Meds: PNV, Metformin 1000qhs, Albuterol PRN, Ursodiol 300mg TID, Pepcid, FE  – Allergies: NKDA  – Will accept blood transfusions? Yes

## 2024-04-10 NOTE — OB NEONATOLOGY/PEDIATRICIAN DELIVERY SUMMARY - NSPEDSNEONOTESA_OBGYN_ALL_OB_FT
Peds called to LDR for cat II tracing for a 39.1wk LGA male born via  to a 32y/o  parent. Parental history of GDMA2, anxiety, PCOS, suspected cholestasis. **RIGHT shoulder dystocia noted during delivery**. Parental labs include Blood Type O-, HIV - , RPR NR , Rubella sent , Hep B - , GBS - 3/28. AROM at 1452 on 4/10 with clear fluids (ROM hours: 3.5H).  Baby emerged vigorous, crying, was w/d/s/s with APGARS of 8/9 . Resuscitation included: NICU called for shoulder dystocia.. Parent plans to initiate breastfeeding, consents Hep B vaccine and declines circ.  Highest maternal temp: 37.0. EOS 0.11.    : 04/10/24  TOB: 1827  Weight: 4390g

## 2024-04-10 NOTE — OB PROVIDER H&P - ASSESSMENT
Assessment  33yoFtM (he/him pronouns)  at 39w1d gestational age admitted for scheduled IOL for GDMA2.    Plan  1. Admit to LND. Routine Labs. IVF.  2. IOL w/ PO cytotec and cervical balloon.  3. Fetus: cat 1 tracing. VTX. EFW 3842g by sono. Continuous EFM. Sono. No concerns.  4. Prenatal issues: GDMA2 (1000qhs). Polyhydramnios (OBED 24)  5. GBS neg  6. Pain: Epidural PRN    Plan per attending physician, Dr. Marifer Klein, PGY1

## 2024-04-10 NOTE — OB PROVIDER H&P - NS_EDDCALCULATED_OBGYN_ALL_OB
Lola Adorno called requesting a refill of the below medication which has been pended for you:     Requested Prescriptions     Pending Prescriptions Disp Refills    warfarin (COUMADIN) 5 MG tablet [Pharmacy Med Name: Warfarin Sodium 5 MG Oral Tablet] 60 tablet 0     Sig: TAKE 2 TABLETS BY MOUTH ONCE DAILY OR AS DIRECTED PER INR INSTRUCTIONS       Last Appointment Date: 5/11/2023  Next Appointment Date: 8/8/2023    No Known Allergies LMP

## 2024-04-10 NOTE — OB PROVIDER H&P - NSHPPHYSICALEXAM_GEN_ALL_CORE
Objective  – VS  T(C): 37 (04-10-24 @ 01:59)  HR: 110 (04-10-24 @ 02:03)  BP: 111/64 (04-10-24 @ 02:03)  RR: 17 (04-10-24 @ 01:59)  SpO2: --  – PE:   CV: RRR  Pulm: breathing comfortably on RA  Abd: gravid, nontender  Extr: moving all extremities with ease  – VE: 2/60/-3  – FHT: baseline 150, mod variability, +accels, -decels  – [place]: q5min (patient does not feel)  – EFW: 3842g by sono  – Sono: vertex

## 2024-04-10 NOTE — PROVIDER CONTACT NOTE (OTHER) - BACKGROUND
Patient s/p , BPB246. Patient , IOL for GDMA2. Patient 39 and 1 day at delivery. Patient hx of anemia on iron.

## 2024-04-10 NOTE — OB PROVIDER LABOR PROGRESS NOTE - NS_SUBJECTIVE/OBJECTIVE_OBGYN_ALL_OB_FT
Cervical balloon placed. 60cc normal saline instilled in both the uterine and vaginal balloons, respectively. Patient tolerated procedure well.

## 2024-04-10 NOTE — CHART NOTE - NSCHARTNOTEFT_GEN_A_CORE
pt was examined at bedside   ve: 5/70/-3 AROM clear IUPC placed   switch out pitocin bag   titrate pitocin as indicated until adequate contraction pattern   continue augmentation

## 2024-04-10 NOTE — DISCHARGE NOTE OB - CARE PROVIDER_API CALL
Estelle Schneider  Obstetrics and Gynecology  [address] [place] [id number]  Phone: [phone]  Fax: [phone]  Follow Up Time:

## 2024-04-10 NOTE — PROVIDER CONTACT NOTE (OTHER) - SITUATION
Patient noted to be tachycardic on orthostatics. Patient denies chest pain, shortness of breath and palpitations. Patient denies dizziness and lightheadedness. Patient has urge to void at this time.

## 2024-04-10 NOTE — OB RN PATIENT PROFILE - NS_CIRCUMCISIONPLAN_OBGYN_ALL_OB
What Is The Reason For Today's Visit?: Preventative Skin Check
Additional History: Patient has spots on his left and right arm he would like evaluated, he says they are tender and enlarging.
No

## 2024-04-10 NOTE — DISCHARGE NOTE OB - MEDICATION SUMMARY - MEDICATIONS TO TAKE
I will START or STAY ON the medications listed below when I get home from the hospital:    ibuprofen 600 mg oral tablet  -- 1 tab(s) by mouth every 6 hours as needed for  moderate pain  -- Indication: For pain    acetaminophen 325 mg oral tablet  -- 3 tab(s) by mouth every 6 hours as needed for  moderate pain  -- Indication: For pain    albuterol 90 mcg/inh inhalation aerosol  -- 2 puff(s) inhaled every 6 hours As needed Shortness of Breath and/or Wheezing  -- Indication: For asthma    dibucaine 1% topical ointment  -- 1 Apply on skin to affected area every 6 hours As needed Perineal discomfort  -- Indication: For vaginal pain    witch hazel 50% topical pad  -- 1 Apply on skin to affected area every 4 hours As needed Perineal discomfort  -- Indication: For vaginal pain

## 2024-04-10 NOTE — PROVIDER CONTACT NOTE (OTHER) - ACTION/TREATMENT ORDERED:
Patient voided 775. Bleeding light, fundus firm and midline. PO intake encouraged. No further interventions. Patient cleared for post partum.

## 2024-04-10 NOTE — OB PROVIDER LABOR PROGRESS NOTE - ASSESSMENT
CNM OB Progress Note    Patient seen and evaluated at bedside.  Denies complaints.  Comfortable w/ anesthesia epidural.      T(C): 36.8 (04-10-24 @ 08:00), Max: 37 (04-10-24 @ 01:59)  HR: 107 (04-10-24 @ 08:22) (84 - 118)  BP: 113/70 (04-10-24 @ 08:18) (11/64 - 137/63)  RR: 16 (04-10-24 @ 08:00) (16 - 18)  SpO2: 98% (04-10-24 @ 08:22) (97% - 100%)    SVE: 5/70/-3      -Labor: cat 1  -GBS neg  -Analgesia: anesthesia epidural    Insulin drip at 1u now  using peanutball  VE difficult to tolerate, cervix posterior, to press PCEA button prior to next exam  consider starting Pitocin once contraction pattern allows  consider AROM with next exam  reposition PRN      -Continue to monitor with EFM & TOCO  -Recheck patient in 2-4 hours or PRN    Discussed with MD Jennie Silva  
LATE NOTE ENTRY    PATIENT SEEN AT 1223 PM        examined, VE unchanged, 5/70/-3    Continue Pitocin augmentation for cat 1 tracing    reposition with peanutball    reposition PRN    Consider AROM with next exam        discussed with MD Schneider 
CNM OB Progress Note    Patient seen and evaluated at bedside.  Reporting severe back pain and crying/screaming with contractions. Uncomfortable w/ anesthesia epidural.      T(C): 36.9 (04-10-24 @ 17:00), Max: 37.1 (04-10-24 @ 14:00)  HR: 82 (04-10-24 @ 18:07) (79 - 128)  BP: 114/56 (04-10-24 @ 17:40) (11/64 - 189/116)  RR: 16 (04-10-24 @ 17:00) (16 - 18)  SpO2: 100% (04-10-24 @ 18:07) (85% - 100%)    SVE: 10/100/-1      -Labor: cat 1 tracing  -Fetus: EFW 8#8  -GBS neg  -Analgesia: anesthesia epidural  -Induction with: iv pitocin    Pitocin continues at 14u  patient recently received anesthesia top-off but reports no relief  MD Bauer called, to assess patient and consider giving additional topoff for pain control  patient declined attempt at trial of push    -Continue to monitor with EFM & IUPC  -Recheck patient in 2-4 hours or PRN    Discussed with MD Jennie Silva  
Cat 1 tracing  For PO cytotec  Cont EFM/TOCO  Anticipate     Spring Klein, PGY-1

## 2024-04-10 NOTE — DISCHARGE NOTE OB - NS MD DC FALL RISK RISK
For information on Fall & Injury Prevention, visit: [email or web address] OR  [email or web address] OR  https://www.cdc.gov/steadi/patient.html

## 2024-04-10 NOTE — PROVIDER CONTACT NOTE (OTHER) - ASSESSMENT
Patient alert and oriented x4. Lochia moderate at this time. Fundus firm and deviated to the right. Patient due to void.

## 2024-04-10 NOTE — DISCHARGE NOTE OB - PATIENT PORTAL LINK FT
You can access the FollowMyHealth Patient Portal offered by [facility] by registering at the following website: [email or web address]. By joining [facility]’s FollowMyHealth portal, you will also be able to view your health information using other applications (apps) compatible with our system.

## 2024-04-10 NOTE — OB PROVIDER DELIVERY SUMMARY - NSPROVIDERDELIVERYNOTE_OBGYN_ALL_OB_FT
c/b right Shoulder dystocia    Called to LDR# 6.   After several pushes, head  of viable male infant in OA position  was delivered over intact perineum at 1827  After delivery of the head, gentle downward traction was applied, however, anterior right shoulder did not deliver for the next 30 seconds and shoulder dystocia was called at that time.  Rey maneuver was then performed (maternal hips were flexed) with no resolution, followed by the suprapubic pressure (as the head was facing toward maternal right, pressure was applied right above symphysis pubis from maternal left toward the right side.  Perineum was then assessed and vaginal access to the posterior sacral hollow was found to be adequate, not necessitating episiotomy.  Since SD did not resolve at that time, [place] maneuvers were performed with success.       Total time from head to body delivery was less than 1 minute.  Cord was cut and clamped x 2, infant handed to awaiting Peds (which were called prior for SD).   Placenta was then delivered spontaneous/complete/intact with 3 VC , fundus firm with fundal massage.  20 U (units) Pit (Pitocin) given in 1 L LR, good hemostasis.      Intact perineum.  EBL 128cc.  Apgars 8 at 1, 9 at 5 min.  3350 g.  FHR: reassuring during the delivery. Gasses were sent, pending.  Anesthesia: epidural.    Infant inspected by peds: moving all extremities.  No SSX of brachial nerve palsy noted.  All of the above events and significance of SD communicated to the patient and her family after delivery. No risk factors were for SD were identified.  Labor curve was reviewed, wnl. Implications for future pregnancies reviewed, including elective  in the future. Over 20 min spent in direct face to face  counseling.  AQA in detail.  Pt verbalized understanding of above events.     MD Schneider and AMPARO Silva present at delivery.     male  apgars 8/9  weight: 4390g, 9#9  QBL 128ml  c/b right Shoulder dystocia    Called to LDR# 6.   After several pushes, head  of viable male infant in OA position  was delivered over intact perineum at 1827  After delivery of the head, gentle downward traction was applied, however, anterior right shoulder did not deliver for the next 30 seconds and shoulder dystocia was called at that time.  Rey maneuver was then performed (maternal hips were flexed) with no resolution, followed by the suprapubic pressure (as the head was facing toward maternal right, pressure was applied right above symphysis pubis from maternal left toward the right side.  Perineum was then assessed and vaginal access to the posterior sacral hollow was found to be adequate, not necessitating episiotomy.  Since SD did not resolve at that time, delivery of posterior arm was performed with success.       Total time from head to body delivery was less than 1 minute.  Cord was cut and clamped x 2, infant handed to awaiting Peds (which were called prior for SD).   Placenta was then delivered spontaneous/complete/intact with 3 VC , fundus firm with fundal massage.  20 U (units) Pit (Pitocin) given in 1 L LR, good hemostasis.      Intact perineum.  EBL 128cc.  Apgars 8 at 1, 9 at 5 min.  3350 g.  FHR: reassuring during the delivery. Gasses were sent, pending.  Anesthesia: epidural.    Infant inspected by peds: moving all extremities.  No SSX of brachial nerve palsy noted.  All of the above events and significance of SD communicated to the patient and her family after delivery. No risk factors were for SD were identified.  Labor curve was reviewed, wnl. Implications for future pregnancies reviewed, including elective  in the future. Over 20 min spent in direct face to face  counseling.  AQA in detail.  Pt verbalized understanding of above events.     MD Schneider and AMPARO Silva present at delivery.     male  apgars 8/9  weight: 4390g, 9#9  QBL 128ml

## 2024-04-10 NOTE — OB RN DELIVERY SUMMARY - NSSELHIDDEN_OBGYN_ALL_OB_FT
[NS_[email or web address]_ALL_OB_FT:[id number]=],[NS_DeliveryAssist1_OBGYN_ALL_OB_FT:[id number]==],[NS_DeliveryRN_OBGYN_ALL_OB_FT:[id number]]

## 2024-04-10 NOTE — OB PROVIDER DELIVERY SUMMARY - NSSELHIDDEN_OBGYN_ALL_OB_FT
[NS_DeliveryAttending1_OBGYN_ALL_OB_FT:[id number]=],[NS_DeliveryAssist1_OBGYN_ALL_OB_FT:[id number]==]

## 2024-04-10 NOTE — OB RN DELIVERY SUMMARY - NS_SEPSISRSKCALC_OBGYN_ALL_OB_FT
EOS calculated successfully. EOS Risk Factor: 0.5/1000 live births ([facility] national incidence); GA=39w1d; Temp=98.78; ROM=3.583; GBS='Negative'; Antibiotics='No antibiotics or any antibiotics < 2 hrs prior to birth'

## 2024-04-11 LAB
BASOPHILS # BLD AUTO: 0.04 K/UL — SIGNIFICANT CHANGE UP (ref 0–0.2)
BASOPHILS NFR BLD AUTO: 0.3 % — SIGNIFICANT CHANGE UP (ref 0–2)
EOSINOPHIL # BLD AUTO: 0.15 K/UL — SIGNIFICANT CHANGE UP (ref 0–0.5)
EOSINOPHIL NFR BLD AUTO: 1.1 % — SIGNIFICANT CHANGE UP (ref 0–6)
HCT VFR BLD CALC: 28.8 % — LOW (ref 34.5–45)
HGB BLD-MCNC: 9 G/DL — LOW (ref 11.5–15.5)
IANC: 10.42 K/UL — HIGH (ref 1.8–7.4)
IMM GRANULOCYTES NFR BLD AUTO: 0.6 % — SIGNIFICANT CHANGE UP (ref 0–0.9)
KLEIHAUER-BETKE CALCULATION: 0 % — SIGNIFICANT CHANGE UP (ref 0–0.2)
LYMPHOCYTES # BLD AUTO: 15.6 % — SIGNIFICANT CHANGE UP (ref 13–44)
LYMPHOCYTES # BLD AUTO: 2.19 K/UL — SIGNIFICANT CHANGE UP (ref 1–3.3)
MCHC RBC-ENTMCNC: 23.1 PG — LOW (ref 27–34)
MCHC RBC-ENTMCNC: 31.3 GM/DL — LOW (ref 32–36)
MCV RBC AUTO: 74 FL — LOW (ref 80–100)
MONOCYTES # BLD AUTO: 1.13 K/UL — HIGH (ref 0–0.9)
MONOCYTES NFR BLD AUTO: 8.1 % — SIGNIFICANT CHANGE UP (ref 2–14)
NEUTROPHILS # BLD AUTO: 10.42 K/UL — HIGH (ref 1.8–7.4)
NEUTROPHILS NFR BLD AUTO: 74.3 % — SIGNIFICANT CHANGE UP (ref 43–77)
NRBC # BLD: 0 /100 WBCS — SIGNIFICANT CHANGE UP (ref 0–0)
NRBC # FLD: 0 K/UL — SIGNIFICANT CHANGE UP (ref 0–0)
PLATELET # BLD AUTO: 336 K/UL — SIGNIFICANT CHANGE UP (ref 150–400)
RBC # BLD: 3.89 M/UL — SIGNIFICANT CHANGE UP (ref 3.8–5.2)
RBC # FLD: 18.2 % — HIGH (ref 10.3–14.5)
WBC # BLD: 14.02 K/UL — HIGH (ref 3.8–10.5)
WBC # FLD AUTO: 14.02 K/UL — HIGH (ref 3.8–10.5)

## 2024-04-11 RX ORDER — IBUPROFEN 200 MG
600 TABLET ORAL EVERY 6 HOURS
Refills: 0 | Status: DISCONTINUED | OUTPATIENT
Start: 2024-04-11 | End: 2024-04-12

## 2024-04-11 RX ADMIN — Medication 975 MILLIGRAM(S): at 22:00

## 2024-04-11 RX ADMIN — SODIUM CHLORIDE 3 MILLILITER(S): 9 INJECTION INTRAMUSCULAR; INTRAVENOUS; SUBCUTANEOUS at 06:41

## 2024-04-11 RX ADMIN — Medication 975 MILLIGRAM(S): at 06:25

## 2024-04-11 RX ADMIN — Medication 975 MILLIGRAM(S): at 06:55

## 2024-04-11 RX ADMIN — Medication 600 MILLIGRAM(S): at 03:20

## 2024-04-11 RX ADMIN — SODIUM CHLORIDE 3 MILLILITER(S): 9 INJECTION INTRAMUSCULAR; INTRAVENOUS; SUBCUTANEOUS at 22:23

## 2024-04-11 RX ADMIN — Medication 1 TABLET(S): at 13:09

## 2024-04-11 RX ADMIN — Medication 600 MILLIGRAM(S): at 03:50

## 2024-04-11 RX ADMIN — Medication 600 MILLIGRAM(S): at 23:57

## 2024-04-11 RX ADMIN — Medication 600 MILLIGRAM(S): at 09:11

## 2024-04-11 RX ADMIN — Medication 975 MILLIGRAM(S): at 00:50

## 2024-04-11 RX ADMIN — Medication 975 MILLIGRAM(S): at 00:10

## 2024-04-11 RX ADMIN — Medication 975 MILLIGRAM(S): at 21:28

## 2024-04-11 RX ADMIN — Medication 600 MILLIGRAM(S): at 09:55

## 2024-04-11 RX ADMIN — SODIUM CHLORIDE 3 MILLILITER(S): 9 INJECTION INTRAMUSCULAR; INTRAVENOUS; SUBCUTANEOUS at 14:00

## 2024-04-11 RX ADMIN — Medication 600 MILLIGRAM(S): at 18:42

## 2024-04-11 RX ADMIN — Medication 600 MILLIGRAM(S): at 17:39

## 2024-04-11 NOTE — PROGRESS NOTE ADULT - SUBJECTIVE AND OBJECTIVE BOX
NP PROVIDER NOTE   Patient seen at bedside resting comfortably offers no current complaints. Pt identifies as He/Him.   Ambulating and voiding without difficulty.  Passing flatus and tolerating regular diet.  both breast/bottle feeding.  Bonding well with  Denies HA, CP, SOB, N/V/D, dizziness, palpitations, worsening abdominal pain, worsening vaginal bleeding, or any other concerns.      Vital Signs Last 24 Hrs  T(C): 36.6 (2024 06:25), Max: 37.1 (10 Apr 2024 14:00)  T(F): 97.8 (2024 06:25), Max: 98.78 (10 Apr 2024 14:00)  HR: 103 (2024 06:25) (78 - 128)  BP: 106/62 (2024 06:25) (84/58 - 189/116)  BP(mean): --  RR: 16 (2024 06:25) (15 - 18)  SpO2: 100% (2024 06:25) (85% - 100%)    Parameters below as of 2024 06:25  Patient On (Oxygen Delivery Method): room air        Physical Exam:     Gen: A&Ox 3, NAD  Chest: CTA B/L  Cardiac: S1,S2; RRR  Breast: Soft, nontender, nonengorged  Abdomen: +BS; Soft, nontender, ND; Fundus firm below umbilicus  Gyn: Min lochia  Ext: Nontender, No edema     Plan:    Day 1   Increased PO hydration   Ambulation encouraged   PNV Daily   Cont. Motrin/Tylenol as needed for pain management   Begin Discharge planning     D/w Dr. Jennie Amaya ANP    NP PROVIDER NOTE   Patient seen at bedside resting comfortably offers no current complaints. Pt identifies as He/Him.   Ambulating and voiding without difficulty.  Passing flatus and tolerating regular diet.  both breast/bottle feeding.  Bonding well with  Denies HA, CP, SOB, N/V/D, dizziness, palpitations, worsening abdominal pain, worsening vaginal bleeding, or any other concerns.    Pt noted to have HR in the low 100s, pt remains asymptomatic. Notify provider if symptoms occur. CBC pending.     Vital Signs Last 24 Hrs  T(C): 36.6 (2024 06:25), Max: 37.1 (10 Apr 2024 14:00)  T(F): 97.8 (2024 06:25), Max: 98.78 (10 Apr 2024 14:00)  HR: 103 (2024 06:25) (78 - 128)  BP: 106/62 (2024 06:25) (84/58 - 189/116)  BP(mean): --  RR: 16 (2024 06:25) (15 - 18)  SpO2: 100% (2024 06:25) (85% - 100%)    Parameters below as of 2024 06:25  Patient On (Oxygen Delivery Method): room air        Physical Exam:     Gen: A&Ox 3, NAD  Chest: CTA B/L  Cardiac: S1,S2; RRR  Breast: Soft, nontender, nonengorged  Abdomen: +BS; Soft, nontender, ND; Fundus firm below umbilicus  Gyn: Min lochia  Ext: Nontender, No edema     Plan:    Day 1   Increased PO hydration   Ambulation encouraged   PNV Daily   Cont. Motrin/Tylenol as needed for pain management   Begin Discharge planning     D/w Dr. Jennie Amaya ANP

## 2024-04-11 NOTE — LACTATION INITIAL EVALUATION - LACTATION INTERVENTIONS
Primary RN made aware of consult and plan./initiate/review safe skin-to-skin/initiate/review hand expression/initiate/review techniques for position and latch/post discharge community resources provided/review techniques to increase milk supply/review techniques to manage sore nipples/engorgement/initiate/review breast massage/compression/reviewed components of an effective feeding and at least 8 effective feedings per day required/reviewed importance of monitoring infant diapers, the breastfeeding log, and minimum output each day/reviewed risks of artificial nipples/reviewed benefits and recommendations for rooming in/reviewed feeding on demand/by cue at least 8 times a day/reviewed indications of inadequate milk transfer that would require supplementation

## 2024-04-12 VITALS
RESPIRATION RATE: 18 BRPM | TEMPERATURE: 98 F | DIASTOLIC BLOOD PRESSURE: 62 MMHG | OXYGEN SATURATION: 99 % | SYSTOLIC BLOOD PRESSURE: 112 MMHG | HEART RATE: 92 BPM

## 2024-04-12 RX ADMIN — Medication 600 MILLIGRAM(S): at 00:50

## 2024-04-12 RX ADMIN — Medication 975 MILLIGRAM(S): at 15:05

## 2024-04-12 RX ADMIN — SODIUM CHLORIDE 3 MILLILITER(S): 9 INJECTION INTRAMUSCULAR; INTRAVENOUS; SUBCUTANEOUS at 06:38

## 2024-04-12 RX ADMIN — Medication 975 MILLIGRAM(S): at 15:30

## 2024-04-12 NOTE — OB RN TRIAGE NOTE - IN THE PAST 12 MONTHS HAVE YOU USED DRUGS OTHER THAN THOSE REQUIRED FOR MEDICAL REASON?
A/P  1.) Dry Eye OU  -s/p BMT 08/2021. Dryness right eye>left eye, symptomatic for photophobia/tearing  -Failed: Restasis/Xiidra (stinging), plugs (scarred puncta), BCL (retention)  -Chronic oral steroid use  -Now excellent response to scleral lens - corneal staining resolved OU. Much improved ocular comfort.   -Wife helping with I&R. Pt on oral steroids causing hands to shake, unable to do himself.  -Right lens fitting loose now that post-op CE/IOL swelling has resolved. Tighten horizontal haptics  -Left eye doing well, no fit adjustment recommended.  -Cornea look excellent today. Symptoms fairly well controlled. Reduce FML to once daily. Continue mucomyst til gone then okay to trial without    2.) Pseudophakia OU  -Doing well, excellent acuity    Order new right lens and mail direct. Follow-up 6 months lens/K/IOP check, sooner prn    I have confirmed the patient's CC, HPI and reviewed Past Medical History, Past Surgical History, Social History, Family History, Problem List, Medication List and agree with Tech note.     Aisha Juarez, ADILENE FAAO FSLS     No

## 2024-04-12 NOTE — PROGRESS NOTE ADULT - ASSESSMENT
Assessment and Plan  PPD #2 s/p   Doing well, bonding with baby, partner at bedside  Encourage ambulation.  PP & PPD Instructions reviewed.  RH negative- s/p rhogam PP  GDMa2- for 2hr GTT 6-12 weeks PP, rx to be given by whp at [facility].  D/C home today  RTO 6 weeks for routine PP visit/PRN

## 2024-04-12 NOTE — PROGRESS NOTE ADULT - SUBJECTIVE AND OBJECTIVE BOX
Post-partum Note,   She is a  33y woman who is now post-partum day: 2    Subjective:  The patient feels well.  She is ambulating.   She is tolerating regular diet.  She denies nausea and vomiting; denies fever.  She is voiding.  Her pain is controlled.  She reports normal postpartum bleeding.  She is breastfeeding and formula feeding.  She denies dizziness, lightheadedness, SOB, CP    Physical exam:    Vital Signs Last 24 Hrs  T(C): 36.8 (2024 06:22), Max: 36.8 (2024 06:22)  T(F): 98.2 (2024 06:22), Max: 98.2 (2024 06:22)  HR: 92 (2024 06:22) (87 - 95)  BP: 112/62 (2024 06:22) (95/68 - 113/60)  BP(mean): --  RR: 18 (2024 06:22) (17 - 18)  SpO2: 99% (:22) (98% - 100%)    Parameters below as of 2024 06:22  Patient On (Oxygen Delivery Method): room air        Gen: NAD  Breast: Soft, nontender, not engorged.  Abdomen: Soft, nontender, no distension , firm uterine fundus at umbilicus.  Pelvic: Normal lochia noted  Ext: No calf tenderness    LABS:                        9.0    14.02 )-----------( 336      ( 2024 09:45 )             28.8          Allergies    Raspberries (Unknown)  No Known Drug Allergies  Cherries (Unknown)      MEDICATIONS  (STANDING):  acetaminophen     Tablet .. 975 milliGRAM(s) Oral <User Schedule>  diphtheria/tetanus/pertussis (acellular) Vaccine (Adacel) 0.5 milliLiter(s) IntraMuscular once  ibuprofen  Tablet. 600 milliGRAM(s) Oral every 6 hours  prenatal multivitamin 1 Tablet(s) Oral daily  sodium chloride 0.9% lock flush 3 milliLiter(s) IV Push every 8 hours    MEDICATIONS  (PRN):  albuterol    90 MICROgram(s) HFA Inhaler 2 Puff(s) Inhalation every 6 hours PRN Shortness of Breath and/or Wheezing  benzocaine 20%/menthol 0.5% Spray 1 Spray(s) Topical every 6 hours PRN for Perineal discomfort  dibucaine 1% Ointment 1 Application(s) Topical every 6 hours PRN Perineal discomfort  diphenhydrAMINE 25 milliGRAM(s) Oral every 6 hours PRN Pruritus  hydrocortisone 1% Cream 1 Application(s) Topical every 6 hours PRN Moderate Pain (4-6)  lanolin Ointment 1 Application(s) Topical every 6 hours PRN nipple soreness  magnesium hydroxide Suspension 30 milliLiter(s) Oral two times a day PRN Constipation  oxyCODONE    IR 5 milliGRAM(s) Oral once PRN Moderate to Severe Pain (4-10)  oxyCODONE    IR 5 milliGRAM(s) Oral every 3 hours PRN Moderate to Severe Pain (4-10)  pramoxine 1%/zinc 5% Cream 1 Application(s) Topical every 4 hours PRN Moderate Pain (4-6)  simethicone 80 milliGRAM(s) Chew every 4 hours PRN Gas  witch hazel Pads 1 Application(s) Topical every 4 hours PRN Perineal discomfort

## 2024-06-14 NOTE — BH INPATIENT PSYCHIATRY DISCHARGE NOTE - NSBHATTESTSEENBY_PSY_A_CORE
Your wound care is being completed by: Wound clinic    Wound Care Instructions   Cleanse wound with gentle non-scented soap and water, pat dry.   Apply ioplex to wound and cover with mextra.   Change dressing 3x/week.     Follow up in 2-3 weeks.     Monitor wounds for signs and symptoms of infection:   Increased redness, swelling or warmth around wound   Foul odor or increased drainage   Fever/chills/body aches  Nausea/vomiting     Please contact wound clinic with any questions or concerns.   We are available Monday through Friday 7 am to 5 pm.   Our phone number is [phone]    Central Scheduling       [phone]       Want to say \"thank you\" to your nurse?   Scan the QR code below to nominate an extraordinary nurse for The [employer] Award.       [email or web address]     attending Psychiatrist without NP/Trainee

## 2024-06-17 NOTE — BH PATIENT PROFILE - NSPROEDAABILITYLEARN_GEN_A_NUR
anxiety What Type Of Note Output Would You Prefer (Optional)?: Standard Output Hpi Title: Evaluation of Skin Lesions Additional History: Lesions of concern located on hands

## 2024-09-17 ENCOUNTER — EMERGENCY (EMERGENCY)
Facility: HOSPITAL | Age: 34
LOS: 1 days | Discharge: HOME | End: 2024-09-17
Attending: EMERGENCY MEDICINE | Admitting: EMERGENCY MEDICINE
Payer: COMMERCIAL

## 2024-09-17 VITALS
SYSTOLIC BLOOD PRESSURE: 118 MMHG | DIASTOLIC BLOOD PRESSURE: 81 MMHG | HEIGHT: 63 IN | OXYGEN SATURATION: 100 % | WEIGHT: 160.06 LBS | RESPIRATION RATE: 16 BRPM | TEMPERATURE: 98 F | HEART RATE: 65 BPM

## 2024-09-17 VITALS
HEART RATE: 71 BPM | DIASTOLIC BLOOD PRESSURE: 66 MMHG | RESPIRATION RATE: 16 BRPM | SYSTOLIC BLOOD PRESSURE: 113 MMHG | TEMPERATURE: 98 F | OXYGEN SATURATION: 100 %

## 2024-09-17 LAB
ALBUMIN SERPL ELPH-MCNC: 4.3 G/DL — SIGNIFICANT CHANGE UP (ref 3.3–5)
ALP SERPL-CCNC: 81 U/L — SIGNIFICANT CHANGE UP (ref 40–120)
ALT FLD-CCNC: 12 U/L — SIGNIFICANT CHANGE UP (ref 4–33)
ANION GAP SERPL CALC-SCNC: 16 MMOL/L — HIGH (ref 7–14)
APPEARANCE UR: CLEAR — SIGNIFICANT CHANGE UP
AST SERPL-CCNC: 15 U/L — SIGNIFICANT CHANGE UP (ref 4–32)
BASOPHILS # BLD AUTO: 0.06 K/UL — SIGNIFICANT CHANGE UP (ref 0–0.2)
BASOPHILS NFR BLD AUTO: 0.9 % — SIGNIFICANT CHANGE UP (ref 0–2)
BILIRUB SERPL-MCNC: 0.3 MG/DL — SIGNIFICANT CHANGE UP (ref 0.2–1.2)
BILIRUB UR-MCNC: NEGATIVE — SIGNIFICANT CHANGE UP
BLOOD GAS VENOUS COMPREHENSIVE RESULT: SIGNIFICANT CHANGE UP
BUN SERPL-MCNC: 13 MG/DL — SIGNIFICANT CHANGE UP (ref 7–23)
CALCIUM SERPL-MCNC: 8.6 MG/DL — SIGNIFICANT CHANGE UP (ref 8.4–10.5)
CHLORIDE SERPL-SCNC: 103 MMOL/L — SIGNIFICANT CHANGE UP (ref 98–107)
CO2 SERPL-SCNC: 20 MMOL/L — LOW (ref 22–31)
COLOR SPEC: YELLOW — SIGNIFICANT CHANGE UP
CREAT SERPL-MCNC: 0.62 MG/DL — SIGNIFICANT CHANGE UP (ref 0.5–1.3)
DIFF PNL FLD: NEGATIVE — SIGNIFICANT CHANGE UP
EGFR: 121 ML/MIN/1.73M2 — SIGNIFICANT CHANGE UP
EOSINOPHIL # BLD AUTO: 0.14 K/UL — SIGNIFICANT CHANGE UP (ref 0–0.5)
EOSINOPHIL NFR BLD AUTO: 2.1 % — SIGNIFICANT CHANGE UP (ref 0–6)
GLUCOSE SERPL-MCNC: 122 MG/DL — HIGH (ref 70–99)
GLUCOSE UR QL: NEGATIVE MG/DL — SIGNIFICANT CHANGE UP
HCG SERPL-ACNC: 3.1 MIU/ML — SIGNIFICANT CHANGE UP
HCT VFR BLD CALC: 42.5 % — SIGNIFICANT CHANGE UP (ref 34.5–45)
HGB BLD-MCNC: 13.8 G/DL — SIGNIFICANT CHANGE UP (ref 11.5–15.5)
IANC: 4.67 K/UL — SIGNIFICANT CHANGE UP (ref 1.8–7.4)
IMM GRANULOCYTES NFR BLD AUTO: 0.5 % — SIGNIFICANT CHANGE UP (ref 0–0.9)
KETONES UR-MCNC: NEGATIVE MG/DL — SIGNIFICANT CHANGE UP
LEUKOCYTE ESTERASE UR-ACNC: NEGATIVE — SIGNIFICANT CHANGE UP
LIDOCAIN IGE QN: 40 U/L — SIGNIFICANT CHANGE UP (ref 7–60)
LYMPHOCYTES # BLD AUTO: 1.42 K/UL — SIGNIFICANT CHANGE UP (ref 1–3.3)
LYMPHOCYTES # BLD AUTO: 21.5 % — SIGNIFICANT CHANGE UP (ref 13–44)
MCHC RBC-ENTMCNC: 28.1 PG — SIGNIFICANT CHANGE UP (ref 27–34)
MCHC RBC-ENTMCNC: 32.5 GM/DL — SIGNIFICANT CHANGE UP (ref 32–36)
MCV RBC AUTO: 86.6 FL — SIGNIFICANT CHANGE UP (ref 80–100)
MONOCYTES # BLD AUTO: 0.28 K/UL — SIGNIFICANT CHANGE UP (ref 0–0.9)
MONOCYTES NFR BLD AUTO: 4.2 % — SIGNIFICANT CHANGE UP (ref 2–14)
NEUTROPHILS # BLD AUTO: 4.67 K/UL — SIGNIFICANT CHANGE UP (ref 1.8–7.4)
NEUTROPHILS NFR BLD AUTO: 70.8 % — SIGNIFICANT CHANGE UP (ref 43–77)
NITRITE UR-MCNC: NEGATIVE — SIGNIFICANT CHANGE UP
NRBC # BLD: 0 /100 WBCS — SIGNIFICANT CHANGE UP (ref 0–0)
NRBC # FLD: 0.03 K/UL — HIGH (ref 0–0)
PH UR: 6.5 — SIGNIFICANT CHANGE UP (ref 5–8)
PLATELET # BLD AUTO: 356 K/UL — SIGNIFICANT CHANGE UP (ref 150–400)
POTASSIUM SERPL-MCNC: 4.2 MMOL/L — SIGNIFICANT CHANGE UP (ref 3.5–5.3)
POTASSIUM SERPL-SCNC: 4.2 MMOL/L — SIGNIFICANT CHANGE UP (ref 3.5–5.3)
PROT SERPL-MCNC: 6.9 G/DL — SIGNIFICANT CHANGE UP (ref 6–8.3)
PROT UR-MCNC: SIGNIFICANT CHANGE UP MG/DL
RBC # BLD: 4.91 M/UL — SIGNIFICANT CHANGE UP (ref 3.8–5.2)
RBC # FLD: 12.6 % — SIGNIFICANT CHANGE UP (ref 10.3–14.5)
SODIUM SERPL-SCNC: 139 MMOL/L — SIGNIFICANT CHANGE UP (ref 135–145)
SP GR SPEC: 1.02 — SIGNIFICANT CHANGE UP (ref 1–1.03)
UROBILINOGEN FLD QL: 1 MG/DL — SIGNIFICANT CHANGE UP (ref 0.2–1)
WBC # BLD: 6.6 K/UL — SIGNIFICANT CHANGE UP (ref 3.8–10.5)
WBC # FLD AUTO: 6.6 K/UL — SIGNIFICANT CHANGE UP (ref 3.8–10.5)

## 2024-09-17 PROCEDURE — 99285 EMERGENCY DEPT VISIT HI MDM: CPT

## 2024-09-17 PROCEDURE — 71046 X-RAY EXAM CHEST 2 VIEWS: CPT | Mod: 26

## 2024-09-17 PROCEDURE — 93010 ELECTROCARDIOGRAM REPORT: CPT

## 2024-09-17 PROCEDURE — 76705 ECHO EXAM OF ABDOMEN: CPT | Mod: 26

## 2024-09-17 RX ORDER — MAGNESIUM, ALUMINUM HYDROXIDE 200-225/5
30 SUSPENSION, ORAL (FINAL DOSE FORM) ORAL ONCE
Refills: 0 | Status: COMPLETED | OUTPATIENT
Start: 2024-09-17 | End: 2024-09-17

## 2024-09-17 RX ORDER — SODIUM CHLORIDE 9 MG/ML
1000 INJECTION INTRAMUSCULAR; INTRAVENOUS; SUBCUTANEOUS ONCE
Refills: 0 | Status: COMPLETED | OUTPATIENT
Start: 2024-09-17 | End: 2024-09-17

## 2024-09-17 RX ORDER — ONDANSETRON 2 MG/ML
4 INJECTION, SOLUTION INTRAMUSCULAR; INTRAVENOUS ONCE
Refills: 0 | Status: COMPLETED | OUTPATIENT
Start: 2024-09-17 | End: 2024-09-17

## 2024-09-17 RX ORDER — FAMOTIDINE 10 MG/ML
20 INJECTION INTRAVENOUS ONCE
Refills: 0 | Status: COMPLETED | OUTPATIENT
Start: 2024-09-17 | End: 2024-09-17

## 2024-09-17 RX ADMIN — FAMOTIDINE 20 MILLIGRAM(S): 10 INJECTION INTRAVENOUS at 06:08

## 2024-09-17 RX ADMIN — SODIUM CHLORIDE 1000 MILLILITER(S): 9 INJECTION INTRAMUSCULAR; INTRAVENOUS; SUBCUTANEOUS at 06:15

## 2024-09-17 RX ADMIN — ONDANSETRON 4 MILLIGRAM(S): 2 INJECTION, SOLUTION INTRAMUSCULAR; INTRAVENOUS at 06:15

## 2024-09-17 RX ADMIN — Medication 4 MILLIGRAM(S): at 06:07

## 2024-09-17 RX ADMIN — Medication 30 MILLILITER(S): at 06:08

## 2024-09-17 NOTE — ED PROVIDER NOTE - NSFOLLOWUPINSTRUCTIONS_ED_ALL_ED_FT
As we spoke about return to ER for fevers, right lower abdominal pain, persistent vomiting or any other symptoms in which you feel you require immediate attention.    I have included your blood work results, and ultrasound results    Follow up with your primary care physician in the next week

## 2024-09-17 NOTE — ED ADULT NURSE NOTE - OBJECTIVE STATEMENT
pt presents to ED A&04 ambulatory at baseline coming in complaining of right sided abdominal pain/flank pain. Patient female to male transgender , also endorsing n/v without blood. Respirations even and unlabored. Lung sounds clear with equal chest rise bilaterally. ABD is soft, non tender, non distended with normal active bowel sounds No complaints of chest pain, headache, dizziness, SOB, fever, chills verbalized. 20GLwrist in place.

## 2024-09-17 NOTE — ED ADULT NURSE NOTE - CHIEF COMPLAINT QUOTE
pt woken up c/o right upper back pain with slight sob. hx. asthma. pt denies chest pain, recent long travel. ekg in progress.

## 2024-09-17 NOTE — ED ADULT TRIAGE NOTE - CHIEF COMPLAINT QUOTE
pt woken up c/o right upper back pain with slight sob. no past medical hx. pt denies chest pain, recent long travel. ekg in progress. pt woken up c/o right upper back pain with slight sob. hx. asthma. pt denies chest pain, recent long travel. ekg in progress.

## 2024-09-17 NOTE — ED PROVIDER NOTE - PHYSICAL EXAMINATION
Well-appearing no acute distress lungs are clear to auscultation heart is regular rate and rhythm abdomen is soft and nontender without rebound or guarding negative Roy's negative McBurney's positive CVA tenderness on the right but not on the left.

## 2024-09-17 NOTE — ED PROVIDER NOTE - CLINICAL SUMMARY MEDICAL DECISION MAKING FREE TEXT BOX
34-year-old male to female transgender  that presents with abdominal and flank pain on the right that we will them up from sleep 2 hours prior to arrival.  Vital signs are noted to be normal.  Physical exam is relatively benign but patient is complaining of 8-9 out of 10 pain at this time.  This could be biliary colic versus kidney stones.  Will require labs and imaging as well as pain control and nausea control with medications and IV fluids.  Will reassess after ultrasound results.

## 2024-09-17 NOTE — ED PROVIDER NOTE - PATIENT PORTAL LINK FT
You can access the FollowMyHealth Patient Portal offered by [facility] by registering at the following website: [email or web address]. By joining [facility]’s FollowMyHealth portal, you will also be able to view your health information using other applications (apps) compatible with our system.

## 2024-09-17 NOTE — ED PROVIDER NOTE - OBJECTIVE STATEMENT
34-year-old female to male transgender G2, P2 that presents with abdominal/flank pain right sided.  Patient states that they went to sleep in the usual state of health and woke up approximately 2 hours prior to arrival with sudden onset 9/10 constant sharp pain in the right upper right flank associated with nausea and vomiting without blood but also associated with increased frequency urination.  No diarrhea or recent illnesses.  No trauma no rashes.  Had some chest pain but no shortness of breath.  Patient reports that a few weeks ago had similar symptoms which lasted a few hours and eventually improved so did not seek any medical attention.  No history of any surgeries in the abdomen or pelvis otherwise.  Denies smoking drinking or drugs

## 2024-09-17 NOTE — ED PROVIDER NOTE - PROGRESS NOTE DETAILS
Ashraf: Patient is feeling improved, ultrasound shows no evidence of cholelithiasis or gallbladder etiology, no hydronephrosis.  Patient is transitioning from female to male.  Not on hormone injections.  Has no chest pain shortness of breath or PE risk factors.  As stated above pain is improved, given ultrasound is negative and patient is improved will discharge home  Strict instructions to return for fevers shortness of breath worsening pain. Ashraf: Patient is feeling improved, ultrasound shows no evidence of cholelithiasis or gallbladder etiology, no hydronephrosis.  Patient is transitioning from female to male.  Not on hormone injections.  Has no chest pain shortness of breath or PE risk factors.  As stated above pain is improved, given ultrasound is negative and patient is improved will discharge home, no RLQ pain on exam to suspect appendicitis.  Strict instructions to return for fevers shortness of breath worsening pain.

## 2025-03-02 NOTE — OB PROVIDER H&P - PRO PRENATAL LABS ORI SOURCE VDRL/RPR
Notified urgent OB Dr. Reyes to place Easi per Dr. VARUN Sommer's request. Pt updated on poc, agreed, and verbalized understanding.    hard copy, drawn during this pregnancy
